# Patient Record
Sex: FEMALE | Race: WHITE | NOT HISPANIC OR LATINO | Employment: OTHER | URBAN - METROPOLITAN AREA
[De-identification: names, ages, dates, MRNs, and addresses within clinical notes are randomized per-mention and may not be internally consistent; named-entity substitution may affect disease eponyms.]

---

## 2020-02-14 ENCOUNTER — OFFICE VISIT (OUTPATIENT)
Dept: UROLOGY | Facility: CLINIC | Age: 60
End: 2020-02-14
Payer: COMMERCIAL

## 2020-02-14 VITALS
HEIGHT: 67 IN | BODY MASS INDEX: 23.54 KG/M2 | DIASTOLIC BLOOD PRESSURE: 85 MMHG | HEART RATE: 78 BPM | SYSTOLIC BLOOD PRESSURE: 145 MMHG | WEIGHT: 150 LBS

## 2020-02-14 DIAGNOSIS — N39.3 STRESS INCONTINENCE OF URINE: Primary | ICD-10-CM

## 2020-02-14 PROCEDURE — 99204 OFFICE O/P NEW MOD 45 MIN: CPT | Mod: S$GLB,,, | Performed by: UROLOGY

## 2020-02-14 PROCEDURE — 3008F PR BODY MASS INDEX (BMI) DOCUMENTED: ICD-10-PCS | Mod: CPTII,S$GLB,, | Performed by: UROLOGY

## 2020-02-14 PROCEDURE — 3008F BODY MASS INDEX DOCD: CPT | Mod: CPTII,S$GLB,, | Performed by: UROLOGY

## 2020-02-14 PROCEDURE — 99204 PR OFFICE/OUTPT VISIT, NEW, LEVL IV, 45-59 MIN: ICD-10-PCS | Mod: S$GLB,,, | Performed by: UROLOGY

## 2020-02-14 RX ORDER — ROSUVASTATIN CALCIUM 5 MG/1
TABLET, COATED ORAL
COMMUNITY
Start: 2020-01-29 | End: 2021-12-27 | Stop reason: DRUGHIGH

## 2020-02-14 RX ORDER — RABEPRAZOLE SODIUM 20 MG/1
TABLET, DELAYED RELEASE ORAL
COMMUNITY
Start: 2020-01-01 | End: 2023-01-10 | Stop reason: SDUPTHER

## 2020-02-14 RX ORDER — DICYCLOMINE HYDROCHLORIDE 10 MG/5ML
SOLUTION ORAL
COMMUNITY
Start: 2020-01-23 | End: 2022-04-21 | Stop reason: SDUPTHER

## 2020-02-14 RX ORDER — TERCONAZOLE 4 MG/G
CREAM VAGINAL
COMMUNITY
Start: 2020-02-12 | End: 2023-06-22

## 2020-02-14 NOTE — PROGRESS NOTES
Subjective:       Patient ID: Johanna Pichardo is a 59 y.o. female.    Chief Complaint: Urinary Frequency      HPI: 58 yo female with complaint of feeling as if she does not empty.  Pt also states having some leakage,  Pt also has some ongoing gyn problems.  Pt states her urologic concerns have been going on for some time and are getting progressively worse       Past Medical History:   Past Medical History:   Diagnosis Date    Anxiety     High cholesterol        Past Surgical Historical:   Past Surgical History:   Procedure Laterality Date    APPENDECTOMY      OOPHORECTOMY          Medications:   Medication List with Changes/Refills   Current Medications    DICYCLOMINE (BENTYL) 10 MG/5 ML SYRUP        RABEPRAZOLE (ACIPHEX) 20 MG TABLET        ROSUVASTATIN (CRESTOR) 5 MG TABLET        TERCONAZOLE (TERAZOL 7) 0.4 % CREA            Past Social History:   Social History     Socioeconomic History    Marital status:      Spouse name: Not on file    Number of children: Not on file    Years of education: Not on file    Highest education level: Not on file   Occupational History    Not on file   Social Needs    Financial resource strain: Not on file    Food insecurity:     Worry: Not on file     Inability: Not on file    Transportation needs:     Medical: Not on file     Non-medical: Not on file   Tobacco Use    Smoking status: Current Every Day Smoker     Packs/day: 0.25   Substance and Sexual Activity    Alcohol use: Yes    Drug use: Never    Sexual activity: Not on file   Lifestyle    Physical activity:     Days per week: Not on file     Minutes per session: Not on file    Stress: Not on file   Relationships    Social connections:     Talks on phone: Not on file     Gets together: Not on file     Attends Sikhism service: Not on file     Active member of club or organization: Not on file     Attends meetings of clubs or organizations: Not on file     Relationship status: Not on file   Other Topics  Concern    Not on file   Social History Narrative    Not on file       Allergies: Review of patient's allergies indicates:  No Known Allergies     Family History: History reviewed. No pertinent family history.     Review of Systems:  Review of Systems   Constitutional: Negative for activity change and appetite change.   HENT: Negative for congestion and dental problem.    Respiratory: Negative for chest tightness and shortness of breath.    Cardiovascular: Negative for chest pain.   Gastrointestinal: Negative for abdominal distention and abdominal pain.   Genitourinary: Negative for decreased urine volume, difficulty urinating, dyspareunia, dysuria, enuresis, flank pain, frequency, genital sores, hematuria, pelvic pain and urgency.   Musculoskeletal: Negative for back pain and neck pain.   Allergic/Immunologic: Negative for immunocompromised state.   Neurological: Negative for dizziness.   Hematological: Negative for adenopathy.   Psychiatric/Behavioral: Negative for agitation, behavioral problems and confusion.       Physical Exam:  Physical Exam   Nursing note and vitals reviewed.  Constitutional: She is oriented to person, place, and time. She appears well-developed and well-nourished.   HENT:   Head: Normocephalic.   Eyes: Pupils are equal, round, and reactive to light.   Neck: Normal range of motion. Neck supple.   Cardiovascular: Normal rate, regular rhythm and normal heart sounds.    Pulmonary/Chest: Effort normal and breath sounds normal.   Abdominal: Soft. Bowel sounds are normal.   Genitourinary:         Musculoskeletal: Normal range of motion.   Neurological: She is alert and oriented to person, place, and time.   Skin: Skin is warm and dry.     Psychiatric: She has a normal mood and affect. Her behavior is normal.       Assessment/Plan:     incomplete bladder emptying and incontinence--will plan cmg and cysto  Problem List Items Addressed This Visit     None

## 2020-02-26 ENCOUNTER — PROCEDURE VISIT (OUTPATIENT)
Dept: UROLOGY | Facility: CLINIC | Age: 60
End: 2020-02-26
Payer: COMMERCIAL

## 2020-02-26 VITALS
SYSTOLIC BLOOD PRESSURE: 187 MMHG | WEIGHT: 148 LBS | DIASTOLIC BLOOD PRESSURE: 86 MMHG | HEIGHT: 67 IN | HEART RATE: 72 BPM | BODY MASS INDEX: 23.23 KG/M2 | RESPIRATION RATE: 16 BRPM

## 2020-02-26 DIAGNOSIS — N39.3 STRESS INCONTINENCE OF URINE: Primary | ICD-10-CM

## 2020-02-26 DIAGNOSIS — Q64.32 CONGENITAL STRICTURE OF URETHRA: ICD-10-CM

## 2020-02-26 LAB
BILIRUB UR QL STRIP: NEGATIVE
GLUCOSE UR QL STRIP: NEGATIVE
KETONES UR QL STRIP: NEGATIVE
LEUKOCYTE ESTERASE UR QL STRIP: NEGATIVE
PH, POC UA: 5.5
POC AMORP, URINE: ABNORMAL
POC BACTI, URINE: ABNORMAL
POC BLOOD, URINE: POSITIVE
POC CASTS, URINE: ABNORMAL
POC CRYST, URINE: ABNORMAL
POC EPITH, URINE: ABNORMAL
POC HCG, URINE: ABNORMAL
POC HYALIN, URINE: 0 LPF
POC MUCUS, URINE: ABNORMAL
POC NITRATES, URINE: NEGATIVE
POC OTHER, URINE: ABNORMAL
POC RBC, URINE: ABNORMAL HPF
POC WBC, URINE: ABNORMAL HPF
PROT UR QL STRIP: NEGATIVE
SP GR UR STRIP: 1 (ref 1–1.03)
UROBILINOGEN UR STRIP-ACNC: 0.2 (ref 0.1–1.1)

## 2020-02-26 PROCEDURE — 52000: ICD-10-PCS | Mod: S$GLB,,, | Performed by: UROLOGY

## 2020-02-26 PROCEDURE — 52000 CYSTOURETHROSCOPY: CPT | Mod: S$GLB,,, | Performed by: UROLOGY

## 2020-02-26 RX ORDER — CIPROFLOXACIN 500 MG/1
500 TABLET ORAL
Status: COMPLETED | OUTPATIENT
Start: 2020-02-26 | End: 2020-02-26

## 2020-02-26 RX ADMIN — CIPROFLOXACIN 500 MG: 500 TABLET ORAL at 12:02

## 2020-02-26 NOTE — PATIENT INSTRUCTIONS
Cystoscopy    Cystoscopy is a procedure that lets your doctor look directly inside your urethra and bladder. It can be used to:  · Help diagnose a problem with your urethra, bladder, or kidneys.  · Take a sample (biopsy) of bladder or urethral tissue.  · Treat certain problems (such as removing kidney stones).  · Place a stent to bypass an obstruction.  · Take special X-rays of the kidneys.  Based on the findings, your doctor may recommend other tests or treatments.  What is a cystoscope?  A cystoscope is a telescope-like instrument that contains lenses and fiberoptics (small glass wires that make bright light). The cystoscope may be straight and rigid, or flexible to bend around curves in the urethra. The doctor may look directly into the cystoscope, or project the image onto a monitor.  Getting ready  · Ask your doctor if you should stop taking any medicines before the procedure.  · Ask whether you should avoid eating or drinking anything after midnight before the procedure.  · Follow any other instructions your doctor gives you.  Tell your doctor before the exam if you:  · Take any medicines, such as aspirin or blood thinners  · Have allergies to any medicines  · Are pregnant   The procedure  Cystoscopy is done in the doctors office, surgery center, or hospital. The doctor and a nurse are present during the procedure. It takes only a few minutes, longer if a biopsy, X-ray, or treatment needs to be done.  During the procedure:  · You lie on an exam table on your back, knees bent and legs apart. You are covered with a drape.  · Your urethra and the area around it are washed. Anesthetic jelly may be applied to numb the urethra. Other pain medicine is usually not needed. In some cases, you may be offered a mild sedative to help you relax. If a more extensive procedure is to be done, such as a biopsy or kidney stone removal, general anesthesia may be needed.  · The cystoscope is inserted. A sterile fluid is put  into the bladder to expand it. You may feel pressure from this fluid.  · When the procedure is done, the cystoscope is removed.  After the procedure  If you had a sedative, general anesthesia, or spinal anesthesia, you must have someone drive you home. Once youre home:  · Drink plenty of fluids.  · You may have burning or light bleeding when you urinate--this is normal.  · Medicines may be prescribed to ease any discomfort or prevent infection. Take these as directed.  · Call your doctor if you have heavy bleeding or blood clots, burning that lasts more than a day, a fever over 100°F  (38° C), or trouble urinating.  Date Last Reviewed: 1/1/2017  © 3435-2226 [employer]. [address] [place] [address], [place] [address]. All rights reserved. This information is not intended as a substitute for professional medical care. Always follow your healthcare professional's instructions.        Time Voiding   While awake, attempt to urinate every 2 hours whether or not you feel the urge.    Double Voiding  After you urinate, wait 10-15 seconds and attempt to empty your bladder whether or not you feel the urge.

## 2020-02-26 NOTE — PROCEDURES
"Cystoscopy with dilation  Date/Time: 2/26/2020 1:30 PM  Performed by: Isra Maurice MD  Authorized by: Isra Maurice MD     Consent Done?:  Yes (Written)  Time out: Immediately prior to procedure a "time out" was called to verify the correct patient, procedure, equipment, support staff and site/side marked as required.    Indications: incontinence and urethral stricture    Indications comment:  Incomplete bladder emptying  Anesthesia:  Intraurethral instillation  Patient sedated?: No    Preparation: Patient was prepped and draped in usual sterile fashion      Scope type:  Rigid cystoscope  Stent inserted: No    Stent removed: No    External exam normal: Yes    Digital exam performed: No    Urethra normal: very tight dilated to 24 Wol[place].  Bladder neck normal: Bladder neck normal   Bladder normal: Yes      Patient tolerance:  Patient tolerated the procedure well with no immediate complications     Will see how she does with the dilation along with timed and double voiding      "

## 2020-03-23 ENCOUNTER — OFFICE VISIT (OUTPATIENT)
Dept: UROLOGY | Facility: CLINIC | Age: 60
End: 2020-03-23
Payer: COMMERCIAL

## 2020-03-23 VITALS
SYSTOLIC BLOOD PRESSURE: 174 MMHG | HEIGHT: 67 IN | BODY MASS INDEX: 23.54 KG/M2 | WEIGHT: 150 LBS | HEART RATE: 66 BPM | DIASTOLIC BLOOD PRESSURE: 93 MMHG

## 2020-03-23 DIAGNOSIS — R33.9 INCOMPLETE BLADDER EMPTYING: ICD-10-CM

## 2020-03-23 DIAGNOSIS — R35.0 URINARY FREQUENCY: ICD-10-CM

## 2020-03-23 DIAGNOSIS — N39.3 STRESS INCONTINENCE: Primary | ICD-10-CM

## 2020-03-23 LAB
BILIRUB UR QL STRIP: NEGATIVE
GLUCOSE UR QL STRIP: NEGATIVE
KETONES UR QL STRIP: NEGATIVE
LEUKOCYTE ESTERASE UR QL STRIP: NEGATIVE
PH, POC UA: 6.5
POC AMORP, URINE: ABNORMAL
POC BACTI, URINE: ABNORMAL
POC BLOOD, URINE: POSITIVE
POC CASTS, URINE: ABNORMAL
POC CRYST, URINE: ABNORMAL
POC EPITH, URINE: +1=
POC HCG, URINE: ABNORMAL
POC HYALIN, URINE: ABNORMAL LPF
POC MUCUS, URINE: ABNORMAL
POC NITRATES, URINE: NEGATIVE
POC OTHER, URINE: ABNORMAL
POC RBC, URINE: ABNORMAL HPF
POC RESIDUAL URINE VOLUME: 0 ML (ref 0–100)
POC WBC, URINE: ABNORMAL HPF
PROT UR QL STRIP: NEGATIVE
SP GR UR STRIP: 1.01 (ref 1–1.03)
UROBILINOGEN UR STRIP-ACNC: 0.2 (ref 0.1–1.1)

## 2020-03-23 PROCEDURE — 3008F BODY MASS INDEX DOCD: CPT | Mod: CPTII,S$GLB,, | Performed by: NURSE PRACTITIONER

## 2020-03-23 PROCEDURE — 51798 US URINE CAPACITY MEASURE: CPT | Mod: S$GLB,,, | Performed by: NURSE PRACTITIONER

## 2020-03-23 PROCEDURE — 3008F PR BODY MASS INDEX (BMI) DOCUMENTED: ICD-10-PCS | Mod: CPTII,S$GLB,, | Performed by: NURSE PRACTITIONER

## 2020-03-23 PROCEDURE — 99213 PR OFFICE/OUTPT VISIT, EST, LEVL III, 20-29 MIN: ICD-10-PCS | Mod: S$GLB,,, | Performed by: NURSE PRACTITIONER

## 2020-03-23 PROCEDURE — 51798 POCT BLADDER SCAN: ICD-10-PCS | Mod: S$GLB,,, | Performed by: NURSE PRACTITIONER

## 2020-03-23 PROCEDURE — 99213 OFFICE O/P EST LOW 20 MIN: CPT | Mod: S$GLB,,, | Performed by: NURSE PRACTITIONER

## 2020-03-23 NOTE — PROGRESS NOTES
Subjective:       Patient ID: Johanna Pichardo is a 59 y.o. female.    Chief Complaint: Other (cysto/dilation fu )      HPI: 51-year-old female, patient Dr. Maurice, presents for follow-up post cysto dilation.  Patient presented with complaints of some stress incontinence, incomplete bladder emptying, and urinary frequency.  Last visit patient's on 02/14/2020 the PVR was 200 cc.  On 02/26/2020 patient had a cysto dilation.  Patient states she has had improvement in her symptoms since the dilation.  Her frequency has greatly decreased.  Patient states her stress incontinence is very rare.  Patient states she feels she is emptying her bladder.  Low patient denies pain or burning urination.  Denies difficulty voiding.  Denies flank pain.  Denies blood in her urine.  No other urinary complaints.  All other health problems are stable at this time.       Past Medical History:   Past Medical History:   Diagnosis Date    Anxiety     High cholesterol        Past Surgical Historical:   Past Surgical History:   Procedure Laterality Date    APPENDECTOMY      CYSTOSCOPY      OOPHORECTOMY          Medications:   Medication List with Changes/Refills   Current Medications    DICYCLOMINE (BENTYL) 10 MG/5 ML SYRUP        RABEPRAZOLE (ACIPHEX) 20 MG TABLET        ROSUVASTATIN (CRESTOR) 5 MG TABLET        TERCONAZOLE (TERAZOL 7) 0.4 % CREA            Past Social History:   Social History     Socioeconomic History    Marital status:      Spouse name: Not on file    Number of children: Not on file    Years of education: Not on file    Highest education level: Not on file   Occupational History    Not on file   Social Needs    Financial resource strain: Not on file    Food insecurity:     Worry: Not on file     Inability: Not on file    Transportation needs:     Medical: Not on file     Non-medical: Not on file   Tobacco Use    Smoking status: Current Every Day Smoker     Packs/day: 0.25   Substance and Sexual Activity     Alcohol use: Yes    Drug use: Never    Sexual activity: Not on file   Lifestyle    Physical activity:     Days per week: Not on file     Minutes per session: Not on file    Stress: Not on file   Relationships    Social connections:     Talks on phone: Not on file     Gets together: Not on file     Attends Lutheran service: Not on file     Active member of club or organization: Not on file     Attends meetings of clubs or organizations: Not on file     Relationship status: Not on file   Other Topics Concern    Not on file   Social History Narrative    Not on file       Allergies: Review of patient's allergies indicates:  No Known Allergies     Family History: History reviewed. No pertinent family history.     Review of Systems:  Review of Systems   Constitutional: Negative for activity change and appetite change.   HENT: Negative for congestion and dental problem.    Respiratory: Negative for chest tightness and shortness of breath.    Cardiovascular: Negative for chest pain.   Gastrointestinal: Negative for abdominal distention.   Genitourinary: Negative for decreased urine volume, difficulty urinating, dyspareunia, dysuria, enuresis, flank pain, frequency, genital sores, hematuria, pelvic pain and urgency.   Musculoskeletal: Negative for back pain and neck pain.   Allergic/Immunologic: Negative for immunocompromised state.   Neurological: Negative for dizziness.   Hematological: Negative for adenopathy.   Psychiatric/Behavioral: Negative for agitation, behavioral problems and confusion.       Physical Exam:  Physical Exam   Nursing note and vitals reviewed.  Constitutional: She is oriented to person, place, and time. She appears well-developed and well-nourished.   HENT:   Head: Normocephalic.   Cardiovascular: Normal rate, regular rhythm and normal heart sounds.    Pulmonary/Chest: Effort normal and breath sounds normal.   Abdominal: Soft. Bowel sounds are normal.   Neurological: She is alert and  oriented to person, place, and time.   Skin: Skin is warm and dry.      Bladder scan:  0 cc.  Urinalysis:  Trace intact blood, red blood cells negative.    Assessment/Plan:   Stress incontinence with incomplete bladder emptying urinary frequency:  Patient states she is doing well since the dilation.  PVR stable from 200 to 0 cc.  Patient states she is happy with results.  Patient has an appointment scheduled for May 25th.  Patient would like to keep that appointment.    Follow-up sooner if needed.  Problem List Items Addressed This Visit     None      Visit Diagnoses     Stress incontinence    -  Primary    Relevant Orders    POCT Urinalysis (w/Micro Option)    POCT Bladder Scan    Incomplete bladder emptying        Relevant Orders    POCT Urinalysis (w/Micro Option)    POCT Bladder Scan    Urinary frequency        Relevant Orders    POCT Urinalysis (w/Micro Option)    POCT Bladder Scan

## 2020-06-11 ENCOUNTER — OFFICE VISIT (OUTPATIENT)
Dept: UROLOGY | Facility: CLINIC | Age: 60
End: 2020-06-11
Payer: COMMERCIAL

## 2020-06-11 VITALS — HEART RATE: 84 BPM | SYSTOLIC BLOOD PRESSURE: 137 MMHG | DIASTOLIC BLOOD PRESSURE: 86 MMHG

## 2020-06-11 DIAGNOSIS — N39.3 STRESS INCONTINENCE: Primary | ICD-10-CM

## 2020-06-11 DIAGNOSIS — R35.0 URINARY FREQUENCY: ICD-10-CM

## 2020-06-11 LAB
BILIRUB UR QL STRIP: NEGATIVE
CLARITY, POC UA: ABNORMAL
COLOR, POC UA: ABNORMAL
GLUCOSE UR QL STRIP: NEGATIVE
KETONES UR QL STRIP: NEGATIVE
LEUKOCYTE ESTERASE UR QL STRIP: NEGATIVE
NITRITE, POC UA: ABNORMAL
PH, POC UA: 5
POC AMORP, URINE: ABNORMAL
POC BACTI, URINE: ABNORMAL
POC BLOOD, URINE: POSITIVE
POC CASTS, URINE: ABNORMAL
POC CRYST, URINE: ABNORMAL
POC EPITH, URINE: ABNORMAL
POC HCG, URINE: ABNORMAL
POC HYALIN, URINE: ABNORMAL LPF
POC MUCUS, URINE: ABNORMAL
POC NITRATES, URINE: NEGATIVE
POC OTHER, URINE: ABNORMAL
POC RBC, URINE: ABNORMAL HPF
POC WBC, URINE: ABNORMAL HPF
PROT UR QL STRIP: NEGATIVE
SP GR UR STRIP: <=1.005 (ref 1–1.03)
UROBILINOGEN UR STRIP-ACNC: 0.2 (ref 0.1–1.1)

## 2020-06-11 PROCEDURE — 99213 OFFICE O/P EST LOW 20 MIN: CPT | Mod: S$GLB,,, | Performed by: NURSE PRACTITIONER

## 2020-06-11 PROCEDURE — 99213 PR OFFICE/OUTPT VISIT, EST, LEVL III, 20-29 MIN: ICD-10-PCS | Mod: S$GLB,,, | Performed by: NURSE PRACTITIONER

## 2020-06-11 NOTE — PROGRESS NOTES
Subjective:       Patient ID: Johanna Pichardo is a 59 y.o. female.    Chief Complaint: Other ( fu )      HPI: 67-year-old female known service Dr. Josafat peralta recently underwent cystoscopy for a complaints of stress incontinence.  She was whole in high residuals.  He did urethral dilation she was very tight dilated her up to 24 [place].  Instructed her to double void with each void.  She returns today for re-evaluation.  States she is dry.  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  Again she remains dry.  No splitting spraying of the urinary stream.  She feels empty at completion; no new urologic complaints       Past Medical History:   Past Medical History:   Diagnosis Date    Anxiety     High cholesterol        Past Surgical Historical:   Past Surgical History:   Procedure Laterality Date    APPENDECTOMY      CYSTOSCOPY      OOPHORECTOMY          Medications:   Medication List with Changes/Refills   Current Medications    DICYCLOMINE (BENTYL) 10 MG/5 ML SYRUP        RABEPRAZOLE (ACIPHEX) 20 MG TABLET        ROSUVASTATIN (CRESTOR) 5 MG TABLET        TERCONAZOLE (TERAZOL 7) 0.4 % CREA            Past Social History:   Social History     Socioeconomic History    Marital status:      Spouse name: Not on file    Number of children: Not on file    Years of education: Not on file    Highest education level: Not on file   Occupational History    Not on file   Social Needs    Financial resource strain: Not on file    Food insecurity:     Worry: Not on file     Inability: Not on file    Transportation needs:     Medical: Not on file     Non-medical: Not on file   Tobacco Use    Smoking status: Current Every Day Smoker     Packs/day: 0.25   Substance and Sexual Activity    Alcohol use: Yes    Drug use: Never    Sexual activity: Not on file   Lifestyle    Physical activity:     Days per  week: Not on file     Minutes per session: Not on file    Stress: Not on file   Relationships    Social connections:     Talks on phone: Not on file     Gets together: Not on file     Attends Gnosticism service: Not on file     Active member of club or organization: Not on file     Attends meetings of clubs or organizations: Not on file     Relationship status: Not on file   Other Topics Concern    Not on file   Social History Narrative    Not on file       Allergies: Review of patient's allergies indicates:  No Known Allergies     Family History: History reviewed. No pertinent family history.     Review of Systems:  Review of Systems   Constitutional: Negative for activity change and appetite change.   HENT: Negative for congestion and dental problem.    Respiratory: Negative for chest tightness and shortness of breath.    Cardiovascular: Negative for chest pain.   Gastrointestinal: Negative for abdominal distention.   Genitourinary: Negative for decreased urine volume, difficulty urinating, dyspareunia, dysuria, enuresis, flank pain, frequency, genital sores, hematuria, pelvic pain and urgency.   Musculoskeletal: Negative for back pain and neck pain.   Allergic/Immunologic: Negative for immunocompromised state.   Neurological: Negative for dizziness.   Hematological: Negative for adenopathy.   Psychiatric/Behavioral: Negative for agitation, behavioral problems and confusion.       Physical Exam:  Physical Exam   Constitutional: She is oriented to person, place, and time. She appears well-developed and well-nourished.   HENT:   Head: Normocephalic and atraumatic.   Eyes: No scleral icterus.   Neck: Normal range of motion.   Cardiovascular: Intact distal pulses.    Pulmonary/Chest: Effort normal and breath sounds normal.   Abdominal: Soft. She exhibits no distension. There is no tenderness.   Genitourinary: Rectum normal and vagina normal. Pelvic exam was performed with patient supine. There is no rash,  tenderness or lesion on the right labia. There is no rash, tenderness or lesion on the left labia.   Musculoskeletal: She exhibits no edema.   Neurological: She is alert and oriented to person, place, and time.   Skin: Skin is warm and dry.     Psychiatric: She has a normal mood and affect.       Assessment/Plan:   Stress incontinence--secondary to incomplete bladder emptying.  She had a normal bladder neck on cystoscopy.  She is completely dry since urethral dilation.  Monitor for recurrence.  Urinalysis today negative, PVR 86 mL    Problem List Items Addressed This Visit     None      Visit Diagnoses     Stress incontinence    -  Primary    Urinary frequency        Relevant Orders    POCT Urinalysis (w/Micro Option)

## 2020-11-10 ENCOUNTER — OFFICE VISIT (OUTPATIENT)
Dept: UROLOGY | Facility: CLINIC | Age: 60
End: 2020-11-10
Payer: COMMERCIAL

## 2020-11-10 DIAGNOSIS — N39.3 STRESS INCONTINENCE OF URINE: ICD-10-CM

## 2020-11-10 DIAGNOSIS — R30.0 DYSURIA: Primary | ICD-10-CM

## 2020-11-10 LAB — POC RESIDUAL URINE VOLUME: 246 ML (ref 0–100)

## 2020-11-10 PROCEDURE — 51798 US URINE CAPACITY MEASURE: CPT | Mod: S$GLB,,, | Performed by: UROLOGY

## 2020-11-10 PROCEDURE — 51798 POCT BLADDER SCAN: ICD-10-PCS | Mod: S$GLB,,, | Performed by: UROLOGY

## 2020-11-10 PROCEDURE — 99213 PR OFFICE/OUTPT VISIT, EST, LEVL III, 20-29 MIN: ICD-10-PCS | Mod: S$GLB,,, | Performed by: UROLOGY

## 2020-11-10 PROCEDURE — 99213 OFFICE O/P EST LOW 20 MIN: CPT | Mod: S$GLB,,, | Performed by: UROLOGY

## 2020-11-10 NOTE — PROGRESS NOTES
Subjective:       Patient ID: Johanna Pichardo is a 60 y.o. female.    Chief Complaint: No chief complaint on file.      HPI: 59 yo female with complaint of generalized irritation below but no dysuria.  Pt has no significant leakage       Past Medical History:   Past Medical History:   Diagnosis Date    Anxiety     High cholesterol        Past Surgical Historical:   Past Surgical History:   Procedure Laterality Date    APPENDECTOMY      CYSTOSCOPY      OOPHORECTOMY          Medications:   Medication List with Changes/Refills   Current Medications    DICYCLOMINE (BENTYL) 10 MG/5 ML SYRUP        RABEPRAZOLE (ACIPHEX) 20 MG TABLET        ROSUVASTATIN (CRESTOR) 5 MG TABLET        TERCONAZOLE (TERAZOL 7) 0.4 % CREA            Past Social History:   Social History     Socioeconomic History    Marital status:      Spouse name: Not on file    Number of children: Not on file    Years of education: Not on file    Highest education level: Not on file   Occupational History    Not on file   Social Needs    Financial resource strain: Not on file    Food insecurity     Worry: Not on file     Inability: Not on file    Transportation needs     Medical: Not on file     Non-medical: Not on file   Tobacco Use    Smoking status: Current Every Day Smoker     Packs/day: 0.25   Substance and Sexual Activity    Alcohol use: Yes    Drug use: Never    Sexual activity: Not on file   Lifestyle    Physical activity     Days per week: Not on file     Minutes per session: Not on file    Stress: Not on file   Relationships    Social connections     Talks on phone: Not on file     Gets together: Not on file     Attends Alevism service: Not on file     Active member of club or organization: Not on file     Attends meetings of clubs or organizations: Not on file     Relationship status: Not on file   Other Topics Concern    Not on file   Social History Narrative    Not on file       Allergies: Review of patient's allergies  indicates:  No Known Allergies     Family History: History reviewed. No pertinent family history.     Review of Systems:  Review of Systems   Constitutional: Negative for activity change and appetite change.   HENT: Negative for congestion and dental problem.    Respiratory: Negative for chest tightness and shortness of breath.    Cardiovascular: Negative for chest pain.   Gastrointestinal: Negative for abdominal distention.   Genitourinary: Negative for decreased urine volume, difficulty urinating, dyspareunia, dysuria, enuresis, flank pain, frequency, genital sores, hematuria, pelvic pain and urgency.   Musculoskeletal: Negative for back pain and neck pain.   Allergic/Immunologic: Negative for immunocompromised state.   Neurological: Negative for dizziness.   Hematological: Negative for adenopathy.   Psychiatric/Behavioral: Negative for agitation, behavioral problems and confusion.       Physical Exam:  Physical Exam  Vitals signs and nursing note reviewed.   Constitutional:       Appearance: She is well-developed.   HENT:      Head: Normocephalic.   Cardiovascular:      Rate and Rhythm: Normal rate and regular rhythm.      Heart sounds: Normal heart sounds.   Pulmonary:      Effort: Pulmonary effort is normal.      Breath sounds: Normal breath sounds.   Abdominal:      General: Bowel sounds are normal.      Palpations: Abdomen is soft.   Genitourinary:      Skin:     General: Skin is warm and dry.   Neurological:      Mental Status: She is alert and oriented to person, place, and time.     ua is ok  Bladder scan is 246    Assessment/Plan:     essentially nl gu exam except for incomplete bladder emptying.  Will do some behavioral modification.  Problem List Items Addressed This Visit     None      Visit Diagnoses     Dysuria    -  Primary    Relevant Orders    POCT Urinalysis (w/Micro Option)    Stress incontinence of urine        Relevant Orders    POCT Bladder Scan

## 2020-12-03 ENCOUNTER — TELEPHONE (OUTPATIENT)
Dept: UROLOGY | Facility: CLINIC | Age: 60
End: 2020-12-03

## 2020-12-03 DIAGNOSIS — R10.2 PELVIC PAIN: Primary | ICD-10-CM

## 2020-12-03 NOTE — TELEPHONE ENCOUNTER
Patient notified that she needs to keep her f/u appointment to discuss any tests she would like to have ordered

## 2020-12-03 NOTE — TELEPHONE ENCOUNTER
----- Message from Lindy Duque sent at 12/3/2020  1:38 PM CST -----  Regarding: call back  Johanna Pichardo called in regards to a vaginal ultra sound and she would like to speak with staff. Please call back [phone]

## 2020-12-03 NOTE — PROGRESS NOTES
Patient complaining of pelvic pain.  Discussed with patient's GYN, will order transvaginal ultra sound.

## 2020-12-08 ENCOUNTER — OFFICE VISIT (OUTPATIENT)
Dept: UROLOGY | Facility: CLINIC | Age: 60
End: 2020-12-08
Payer: COMMERCIAL

## 2020-12-08 VITALS — HEIGHT: 67 IN | RESPIRATION RATE: 18 BRPM | WEIGHT: 150 LBS | BODY MASS INDEX: 23.54 KG/M2

## 2020-12-08 DIAGNOSIS — R10.30 LOWER ABDOMINAL PAIN: Primary | ICD-10-CM

## 2020-12-08 DIAGNOSIS — Q64.32 CONGENITAL STRICTURE OF URETHRA: ICD-10-CM

## 2020-12-08 DIAGNOSIS — N39.3 STRESS INCONTINENCE: ICD-10-CM

## 2020-12-08 DIAGNOSIS — R10.2 PELVIC PAIN: ICD-10-CM

## 2020-12-08 LAB — POC RESIDUAL URINE VOLUME: 84 ML (ref 0–100)

## 2020-12-08 PROCEDURE — 99214 PR OFFICE/OUTPT VISIT, EST, LEVL IV, 30-39 MIN: ICD-10-PCS | Mod: S$GLB,,, | Performed by: NURSE PRACTITIONER

## 2020-12-08 PROCEDURE — 99214 OFFICE O/P EST MOD 30 MIN: CPT | Mod: S$GLB,,, | Performed by: NURSE PRACTITIONER

## 2020-12-08 PROCEDURE — 51798 US URINE CAPACITY MEASURE: CPT | Mod: S$GLB,,, | Performed by: NURSE PRACTITIONER

## 2020-12-08 PROCEDURE — 51798 POCT BLADDER SCAN: ICD-10-PCS | Mod: S$GLB,,, | Performed by: NURSE PRACTITIONER

## 2020-12-08 NOTE — PROGRESS NOTES
Patient seen chart reviewed case discussed with Gideon.  Patients pain is not urologic.  Patient to proceed with her pelvic ultrasound.  We will contact her with the results and arrange fu.

## 2020-12-08 NOTE — PROGRESS NOTES
Subjective:       Patient ID: Johanna Pichardo is a 60 y.o. female.    Chief Complaint: Follow-up      HPI: 60-year-old female, patient Dr. Maurice, presents with complaint of pelvic pain.  Patient presents with complaint of lower abdominal/pelvic pain.  Patient rates the pain presently a 5/10 with episodes of 10/10.  Patient describes the pain as pressure.  Patient patient denies any pain or burning urination.  States she has a pretty good stream.  Denies any difficulty voiding.  Denies blood in urine.  Denies flank pain.  Denies fever.  The patient history of stress incontinence and a congenital stricture the urethra.  Patient underwent a cysto dilation in February 2020.    Patient does have history of uterine fibroids.  Ultrasound on 01/13/2020 shows 1.2 cm mass in the lower urine segment consistent with a fibroid.  No other urinary complaints.  All other health problems appear stable at this time.       Past Medical History:   Past Medical History:   Diagnosis Date    Anxiety     High cholesterol        Past Surgical Historical:   Past Surgical History:   Procedure Laterality Date    APPENDECTOMY      CYSTOSCOPY      OOPHORECTOMY          Medications:   Medication List with Changes/Refills   Current Medications    DICYCLOMINE (BENTYL) 10 MG/5 ML SYRUP        RABEPRAZOLE (ACIPHEX) 20 MG TABLET        ROSUVASTATIN (CRESTOR) 5 MG TABLET        TERCONAZOLE (TERAZOL 7) 0.4 % CREA            Past Social History:   Social History     Socioeconomic History    Marital status:      Spouse name: Not on file    Number of children: Not on file    Years of education: Not on file    Highest education level: Not on file   Occupational History    Not on file   Social Needs    Financial resource strain: Not on file    Food insecurity     Worry: Not on file     Inability: Not on file    Transportation needs     Medical: Not on file     Non-medical: Not on file   Tobacco Use    Smoking status: Current Every Day  Smoker     Packs/day: 0.25   Substance and Sexual Activity    Alcohol use: Yes    Drug use: Never    Sexual activity: Not on file   Lifestyle    Physical activity     Days per week: Not on file     Minutes per session: Not on file    Stress: Not on file   Relationships    Social connections     Talks on phone: Not on file     Gets together: Not on file     Attends Shinto service: Not on file     Active member of club or organization: Not on file     Attends meetings of clubs or organizations: Not on file     Relationship status: Not on file   Other Topics Concern    Not on file   Social History Narrative    Not on file       Allergies: Review of patient's allergies indicates:  No Known Allergies     Family History: History reviewed. No pertinent family history.     Review of Systems:  Review of Systems   Constitutional: Negative for activity change and appetite change.   HENT: Negative for congestion and dental problem.    Respiratory: Negative for chest tightness and shortness of breath.    Cardiovascular: Negative for chest pain.   Gastrointestinal: Positive for abdominal pain. Negative for abdominal distention.   Genitourinary: Negative for decreased urine volume, difficulty urinating, dyspareunia, dysuria, enuresis, flank pain, frequency, genital sores, hematuria, pelvic pain and urgency.   Musculoskeletal: Negative for back pain and neck pain.   Allergic/Immunologic: Negative for immunocompromised state.   Neurological: Negative for dizziness.   Hematological: Negative for adenopathy.   Psychiatric/Behavioral: Negative for agitation, behavioral problems and confusion.       Physical Exam:  Physical Exam  Vitals signs and nursing note reviewed.   Constitutional:       Appearance: She is well-developed.   HENT:      Head: Normocephalic.   Eyes:      Pupils: Pupils are equal, round, and reactive to light.   Neck:      Musculoskeletal: Normal range of motion and neck supple.   Cardiovascular:      Rate  and Rhythm: Normal rate and regular rhythm.      Heart sounds: Normal heart sounds.   Pulmonary:      Effort: Pulmonary effort is normal.      Breath sounds: Normal breath sounds.   Abdominal:      General: Bowel sounds are normal.      Palpations: Abdomen is soft.   Musculoskeletal: Normal range of motion.   Skin:     General: Skin is warm and dry.   Neurological:      Mental Status: She is alert and oriented to person, place, and time.   Psychiatric:         Behavior: Behavior normal.       Urinalysis:  Trace intact blood, red blood cells 0-2.  Bladder scan:  84 cc    Assessment/Plan:   1.  Lower abdominal/pelvic pain:  Patient will be sent for a none will be transvaginal ultrasound.    2.  Stress incontinence:  Patient states she has been doing well with timed voiding and double voiding.    3.  Congenital urethral stricture:  Patient bladder scan is good.  Last bladder scan was 400.    Will contact the patient after her ultrasound to arrange follow-up.    Problem List Items Addressed This Visit     None      Visit Diagnoses     Lower abdominal pain    -  Primary    Relevant Orders    POCT Urinalysis (w/Micro Option)    Pelvic pain        Relevant Orders    POCT Urinalysis (w/Micro Option)    Stress incontinence        Congenital stricture of urethra

## 2021-11-03 ENCOUNTER — OFFICE VISIT (OUTPATIENT)
Dept: UROLOGY | Facility: CLINIC | Age: 61
End: 2021-11-03
Payer: COMMERCIAL

## 2021-11-03 VITALS — HEIGHT: 67 IN | BODY MASS INDEX: 24.17 KG/M2 | WEIGHT: 154 LBS

## 2021-11-03 DIAGNOSIS — R35.0 URINARY FREQUENCY: Primary | ICD-10-CM

## 2021-11-03 DIAGNOSIS — R39.15 URINARY URGENCY: ICD-10-CM

## 2021-11-03 DIAGNOSIS — R31.29 HEMATURIA, MICROSCOPIC: ICD-10-CM

## 2021-11-03 PROCEDURE — 1159F PR MEDICATION LIST DOCUMENTED IN MEDICAL RECORD: ICD-10-PCS | Mod: CPTII,S$GLB,, | Performed by: NURSE PRACTITIONER

## 2021-11-03 PROCEDURE — 51798 POCT BLADDER SCAN: ICD-10-PCS | Mod: S$GLB,,, | Performed by: NURSE PRACTITIONER

## 2021-11-03 PROCEDURE — 51798 US URINE CAPACITY MEASURE: CPT | Mod: S$GLB,,, | Performed by: NURSE PRACTITIONER

## 2021-11-03 PROCEDURE — 1160F RVW MEDS BY RX/DR IN RCRD: CPT | Mod: CPTII,S$GLB,, | Performed by: NURSE PRACTITIONER

## 2021-11-03 PROCEDURE — 4010F PR ACE/ARB THEARPY RXD/TAKEN: ICD-10-PCS | Mod: CPTII,S$GLB,, | Performed by: NURSE PRACTITIONER

## 2021-11-03 PROCEDURE — 4010F ACE/ARB THERAPY RXD/TAKEN: CPT | Mod: CPTII,S$GLB,, | Performed by: NURSE PRACTITIONER

## 2021-11-03 PROCEDURE — 1159F MED LIST DOCD IN RCRD: CPT | Mod: CPTII,S$GLB,, | Performed by: NURSE PRACTITIONER

## 2021-11-03 PROCEDURE — 99214 PR OFFICE/OUTPT VISIT, EST, LEVL IV, 30-39 MIN: ICD-10-PCS | Mod: S$GLB,,, | Performed by: NURSE PRACTITIONER

## 2021-11-03 PROCEDURE — 99214 OFFICE O/P EST MOD 30 MIN: CPT | Mod: S$GLB,,, | Performed by: NURSE PRACTITIONER

## 2021-11-03 PROCEDURE — 3008F PR BODY MASS INDEX (BMI) DOCUMENTED: ICD-10-PCS | Mod: CPTII,S$GLB,, | Performed by: NURSE PRACTITIONER

## 2021-11-03 PROCEDURE — 3008F BODY MASS INDEX DOCD: CPT | Mod: CPTII,S$GLB,, | Performed by: NURSE PRACTITIONER

## 2021-11-03 PROCEDURE — 1160F PR REVIEW ALL MEDS BY PRESCRIBER/CLIN PHARMACIST DOCUMENTED: ICD-10-PCS | Mod: CPTII,S$GLB,, | Performed by: NURSE PRACTITIONER

## 2021-11-03 RX ORDER — MELOXICAM 7.5 MG/1
TABLET ORAL
COMMUNITY
Start: 2021-10-14 | End: 2022-07-26 | Stop reason: ALTCHOICE

## 2021-11-03 RX ORDER — OMEPRAZOLE 40 MG/1
CAPSULE, DELAYED RELEASE ORAL
COMMUNITY
Start: 2021-10-14 | End: 2023-01-10

## 2021-11-03 RX ORDER — CLOBETASOL PROPIONATE 0.5 MG/G
CREAM TOPICAL
COMMUNITY
Start: 2021-10-11

## 2021-11-03 RX ORDER — LOTEPREDNOL ETABONATE 2 MG/ML
SUSPENSION/ DROPS OPHTHALMIC
COMMUNITY
Start: 2021-10-11

## 2021-11-03 RX ORDER — MONTELUKAST SODIUM 10 MG/1
TABLET ORAL
COMMUNITY
Start: 2021-10-14 | End: 2024-01-11

## 2021-11-03 RX ORDER — OXYBUTYNIN CHLORIDE 10 MG/1
10 TABLET, EXTENDED RELEASE ORAL DAILY
Qty: 30 TABLET | Refills: 11 | Status: SHIPPED | OUTPATIENT
Start: 2021-11-03 | End: 2022-11-10

## 2021-11-03 RX ORDER — NYSTATIN AND TRIAMCINOLONE ACETONIDE 100000; 1 [USP'U]/G; MG/G
CREAM TOPICAL
COMMUNITY
Start: 2021-09-15

## 2021-11-03 RX ORDER — LOSARTAN POTASSIUM 25 MG/1
TABLET ORAL
COMMUNITY
Start: 2021-10-13 | End: 2023-01-26

## 2021-11-03 RX ORDER — CLORAZEPATE DIPOTASSIUM 7.5 MG/1
TABLET ORAL
COMMUNITY
Start: 2021-10-22

## 2021-11-03 RX ORDER — AMITRIPTYLINE HYDROCHLORIDE 25 MG/1
TABLET, FILM COATED ORAL
COMMUNITY
Start: 2021-10-14 | End: 2024-01-11

## 2021-11-05 LAB — POC RESIDUAL URINE VOLUME: 36 ML (ref 0–100)

## 2021-12-27 ENCOUNTER — OFFICE VISIT (OUTPATIENT)
Dept: UROLOGY | Facility: CLINIC | Age: 61
End: 2021-12-27
Payer: COMMERCIAL

## 2021-12-27 DIAGNOSIS — R39.15 URINARY URGENCY: ICD-10-CM

## 2021-12-27 DIAGNOSIS — R31.29 HEMATURIA, MICROSCOPIC: ICD-10-CM

## 2021-12-27 DIAGNOSIS — R35.0 URINARY FREQUENCY: Primary | ICD-10-CM

## 2021-12-27 PROCEDURE — 1159F PR MEDICATION LIST DOCUMENTED IN MEDICAL RECORD: ICD-10-PCS | Mod: CPTII,S$GLB,, | Performed by: NURSE PRACTITIONER

## 2021-12-27 PROCEDURE — 1160F RVW MEDS BY RX/DR IN RCRD: CPT | Mod: CPTII,S$GLB,, | Performed by: NURSE PRACTITIONER

## 2021-12-27 PROCEDURE — 1159F MED LIST DOCD IN RCRD: CPT | Mod: CPTII,S$GLB,, | Performed by: NURSE PRACTITIONER

## 2021-12-27 PROCEDURE — 4010F ACE/ARB THERAPY RXD/TAKEN: CPT | Mod: CPTII,S$GLB,, | Performed by: NURSE PRACTITIONER

## 2021-12-27 PROCEDURE — 1160F PR REVIEW ALL MEDS BY PRESCRIBER/CLIN PHARMACIST DOCUMENTED: ICD-10-PCS | Mod: CPTII,S$GLB,, | Performed by: NURSE PRACTITIONER

## 2021-12-27 PROCEDURE — 99213 PR OFFICE/OUTPT VISIT, EST, LEVL III, 20-29 MIN: ICD-10-PCS | Mod: S$GLB,,, | Performed by: NURSE PRACTITIONER

## 2021-12-27 PROCEDURE — 4010F PR ACE/ARB THEARPY RXD/TAKEN: ICD-10-PCS | Mod: CPTII,S$GLB,, | Performed by: NURSE PRACTITIONER

## 2021-12-27 PROCEDURE — 99213 OFFICE O/P EST LOW 20 MIN: CPT | Mod: S$GLB,,, | Performed by: NURSE PRACTITIONER

## 2021-12-27 RX ORDER — BENZONATATE 100 MG/1
CAPSULE ORAL
COMMUNITY
Start: 2021-12-03 | End: 2022-07-26 | Stop reason: ALTCHOICE

## 2021-12-27 RX ORDER — ROSUVASTATIN CALCIUM 10 MG/1
TABLET, COATED ORAL
COMMUNITY
Start: 2021-09-29

## 2021-12-27 RX ORDER — AZELASTINE HCL 205.5 UG/1
SPRAY NASAL
COMMUNITY
Start: 2021-12-01 | End: 2024-01-11

## 2022-01-28 ENCOUNTER — TELEPHONE (OUTPATIENT)
Dept: GASTROENTEROLOGY | Facility: CLINIC | Age: 62
End: 2022-01-28
Payer: COMMERCIAL

## 2022-01-28 NOTE — TELEPHONE ENCOUNTER
----- Message from Lucy Marmolejo sent at 1/28/2022 11:50 AM CST -----  Regarding: issues  [phone]     Pt having issues and wants to speak w/ MA. Appt already ellie.    VL

## 2022-02-21 ENCOUNTER — OFFICE VISIT (OUTPATIENT)
Dept: GASTROENTEROLOGY | Facility: CLINIC | Age: 62
End: 2022-02-21
Payer: COMMERCIAL

## 2022-02-21 ENCOUNTER — TELEPHONE (OUTPATIENT)
Dept: GASTROENTEROLOGY | Facility: CLINIC | Age: 62
End: 2022-02-21

## 2022-02-21 VITALS
OXYGEN SATURATION: 98 % | HEIGHT: 67 IN | DIASTOLIC BLOOD PRESSURE: 83 MMHG | WEIGHT: 155 LBS | BODY MASS INDEX: 24.33 KG/M2 | HEART RATE: 60 BPM | SYSTOLIC BLOOD PRESSURE: 161 MMHG

## 2022-02-21 DIAGNOSIS — K58.1 IRRITABLE BOWEL SYNDROME WITH CONSTIPATION: Primary | ICD-10-CM

## 2022-02-21 DIAGNOSIS — Z86.010 PERSONAL HISTORY OF COLONIC POLYPS: ICD-10-CM

## 2022-02-21 DIAGNOSIS — R10.11 RUQ ABDOMINAL PAIN: ICD-10-CM

## 2022-02-21 DIAGNOSIS — R14.0 ABDOMINAL BLOATING: ICD-10-CM

## 2022-02-21 PROCEDURE — 99213 OFFICE O/P EST LOW 20 MIN: CPT | Mod: S$GLB,,, | Performed by: INTERNAL MEDICINE

## 2022-02-21 PROCEDURE — 3077F SYST BP >= 140 MM HG: CPT | Mod: CPTII,S$GLB,, | Performed by: INTERNAL MEDICINE

## 2022-02-21 PROCEDURE — 3008F PR BODY MASS INDEX (BMI) DOCUMENTED: ICD-10-PCS | Mod: CPTII,S$GLB,, | Performed by: INTERNAL MEDICINE

## 2022-02-21 PROCEDURE — 4010F PR ACE/ARB THEARPY RXD/TAKEN: ICD-10-PCS | Mod: CPTII,S$GLB,, | Performed by: INTERNAL MEDICINE

## 2022-02-21 PROCEDURE — 1160F RVW MEDS BY RX/DR IN RCRD: CPT | Mod: CPTII,S$GLB,, | Performed by: INTERNAL MEDICINE

## 2022-02-21 PROCEDURE — 3079F DIAST BP 80-89 MM HG: CPT | Mod: CPTII,S$GLB,, | Performed by: INTERNAL MEDICINE

## 2022-02-21 PROCEDURE — 1159F PR MEDICATION LIST DOCUMENTED IN MEDICAL RECORD: ICD-10-PCS | Mod: CPTII,S$GLB,, | Performed by: INTERNAL MEDICINE

## 2022-02-21 PROCEDURE — 3008F BODY MASS INDEX DOCD: CPT | Mod: CPTII,S$GLB,, | Performed by: INTERNAL MEDICINE

## 2022-02-21 PROCEDURE — 1159F MED LIST DOCD IN RCRD: CPT | Mod: CPTII,S$GLB,, | Performed by: INTERNAL MEDICINE

## 2022-02-21 PROCEDURE — 3077F PR MOST RECENT SYSTOLIC BLOOD PRESSURE >= 140 MM HG: ICD-10-PCS | Mod: CPTII,S$GLB,, | Performed by: INTERNAL MEDICINE

## 2022-02-21 PROCEDURE — 1160F PR REVIEW ALL MEDS BY PRESCRIBER/CLIN PHARMACIST DOCUMENTED: ICD-10-PCS | Mod: CPTII,S$GLB,, | Performed by: INTERNAL MEDICINE

## 2022-02-21 PROCEDURE — 99213 PR OFFICE/OUTPT VISIT, EST, LEVL III, 20-29 MIN: ICD-10-PCS | Mod: S$GLB,,, | Performed by: INTERNAL MEDICINE

## 2022-02-21 PROCEDURE — 4010F ACE/ARB THERAPY RXD/TAKEN: CPT | Mod: CPTII,S$GLB,, | Performed by: INTERNAL MEDICINE

## 2022-02-21 PROCEDURE — 3079F PR MOST RECENT DIASTOLIC BLOOD PRESSURE 80-89 MM HG: ICD-10-PCS | Mod: CPTII,S$GLB,, | Performed by: INTERNAL MEDICINE

## 2022-02-21 NOTE — LETTER
February 21, 2022        Matt Levy MD  [facility] [address] [place] [address]             [place] [facility]  [address] [place] [address]  Phone: [phone]  Fax: [phone]   Patient: Johanna Pichardo   MR Number: [id number]   YOB: 1960   Date of Visit: 2/21/2022       Dear Dr. Levy:    Thank you for referring Johanna Pichardo to me for evaluation. Attached you will find relevant portions of my assessment and plan of care.    If you have questions, please do not hesitate to call me. I look forward to following Johanna Pichardo along with you.    Sincerely,      Mikayla Hikcs MD            CC  No Recipients    Enclosure

## 2022-02-21 NOTE — PROGRESS NOTES
Clinic Note    Reason for visit:  The primary encounter diagnosis was Irritable bowel syndrome with constipation. Diagnoses of Abdominal bloating, RUQ abdominal pain, and Personal history of colonic polyps were also pertinent to this visit.    PCP: [facility]   [address] / [place] [address]    HPI:  This is a 61 y.o. female who was taken her callus as typical an open the [place] and ended up with a compression fraction of her lumbar spine.  She was given Toradol in 2 weeks afterwards she was still feeling the GI side effects from it.  But on AcipHex 20 mg by mouth daily.  Given tramadol but has not taken yet.  Still gets epigastric right upper quadrant discomfort.  Get sleepy from dicyclomine.  Takes it at night and still may help her through the following day.  Given amitriptyline 25 mg but has not started yet.  She will consider taking half a tablet nightly for 2 weeks before going to the full tablet.  Metamucil works better for her than the gummy    Last OV note:  Constipation: controlled with stool softener/probiotic  Pain, RUQ: intermittent, longstanding, regulated BMs with fiber with >75% improvement, PRN Bentyl helps but makes her sleepy (had liquid Bentyl and helps), may need rifaximin course, may need to consider amitriptyline (ECG ~2017 by PCP)  Screening for malignant neoplasms of colon: 2019 with one polyp  Liver function tests abnormal: 2019 LFT nl, get last set to see if liver work up needed.    Review of Systems   Constitutional: Negative for chills, diaphoresis, fatigue, fever and unexpected weight change.   HENT: Positive for postnasal drip. Negative for mouth sores, nosebleeds, sore throat, trouble swallowing and voice change.    Eyes: Positive for eye dryness. Negative for pain and discharge.   Respiratory: Negative for apnea, cough, choking, chest tightness, shortness of breath and wheezing.    Cardiovascular: Negative for chest pain, palpitations,  leg swelling and claudication.   Gastrointestinal: Positive for abdominal pain. Negative for abdominal distention, anal bleeding, blood in stool, change in bowel habit, constipation, diarrhea, nausea, rectal pain, vomiting, reflux, fecal incontinence and change in bowel habit.   Genitourinary: Positive for hematuria. Negative for bladder incontinence, difficulty urinating, dysuria, flank pain and frequency.   Musculoskeletal: Positive for back pain. Negative for arthralgias, joint swelling and joint deformity.   Integumentary:  Negative for color change, rash and wound.   Allergic/Immunologic: Positive for environmental allergies. Negative for food allergies.   Neurological: Negative for seizures, facial asymmetry, speech difficulty, weakness, headaches and memory loss.   Hematological: Negative for adenopathy. Does not bruise/bleed easily.   Psychiatric/Behavioral: Negative for agitation, behavioral problems, confusion, hallucinations and sleep disturbance.      Past Medical History:   Diagnosis Date    Allergy     Anxiety     GERD (gastroesophageal reflux disease)     High cholesterol     Hypertension      Past Surgical History:   Procedure Laterality Date    APPENDECTOMY      CYSTOSCOPY      OOPHORECTOMY       History reviewed. No pertinent family history.  Social History     Tobacco Use    Smoking status: Current Every Day Smoker     Packs/day: 0.25    Smokeless tobacco: Never Used   Substance Use Topics    Alcohol use: Yes    Drug use: Never     Review of patient's allergies indicates:  No Known Allergies   Medication List with Changes/Refills   Current Medications    ALREX 0.2 % DRPS        AMITRIPTYLINE (ELAVIL) 25 MG TABLET        AZELASTINE 205.5 MCG (0.15 %) SPRY        BENZONATATE (TESSALON) 100 MG CAPSULE        CLOBETASOL (TEMOVATE) 0.05 % CREAM        CLORAZEPATE (TRANXENE) 7.5 MG TAB        DICYCLOMINE (BENTYL) 10 MG/5 ML SYRUP        LOSARTAN (COZAAR) 25 MG TABLET        MELOXICAM (MOBIC)  "7.5 MG TABLET        MONTELUKAST (SINGULAIR) 10 MG TABLET        NYSTATIN-TRIAMCINOLONE (MYCOLOG II) CREAM        OMEPRAZOLE (PRILOSEC) 40 MG CAPSULE        OXYBUTYNIN (DITROPAN-XL) 10 MG 24 HR TABLET    Take 1 tablet (10 mg total) by mouth once daily.    RABEPRAZOLE (ACIPHEX) 20 MG TABLET        ROSUVASTATIN (CRESTOR) 10 MG TABLET        TERCONAZOLE (TERAZOL 7) 0.4 % CREA             Vital Signs:  BP (!) 161/83   Pulse 60   Ht 5' 7" (1.702 m)   Wt 70.3 kg (155 lb)   SpO2 98%   BMI 24.28 kg/m²   Body mass index is 24.28 kg/m².      Physical Exam  Vitals reviewed.   Constitutional:       General: She is awake. She is not in acute distress.     Appearance: Normal appearance. She is well-developed. She is not ill-appearing, toxic-appearing or diaphoretic.   HENT:      Head: Normocephalic and atraumatic.      Nose: Nose normal.      Mouth/Throat:      Mouth: Mucous membranes are moist.      Pharynx: Oropharynx is clear. No oropharyngeal exudate or posterior oropharyngeal erythema.   Eyes:      General: Lids are normal. Gaze aligned appropriately. No scleral icterus.        Right eye: No discharge.         Left eye: No discharge.      Extraocular Movements: Extraocular movements intact.      Conjunctiva/sclera: Conjunctivae normal.   Neck:      Trachea: Trachea normal.   Cardiovascular:      Rate and Rhythm: Normal rate and regular rhythm.      Pulses:           Radial pulses are 2+ on the right side and 2+ on the left side.   Pulmonary:      Effort: Pulmonary effort is normal. No respiratory distress.      Breath sounds: Normal breath sounds. No stridor. No wheezing or rhonchi.   Chest:      Chest wall: No tenderness.   Abdominal:      General: Abdomen is flat. Bowel sounds are normal. There is no distension.      Palpations: Abdomen is soft. There is no fluid wave, hepatomegaly or mass.      Tenderness: There is no abdominal tenderness. There is no guarding or rebound.   Musculoskeletal:         General: No " tenderness or deformity.      Cervical back: Full passive range of motion without pain and neck supple. No tenderness.      Right lower leg: No edema.      Left lower leg: No edema.   Lymphadenopathy:      Cervical: No cervical adenopathy.   Skin:     General: Skin is warm and dry.      Capillary Refill: Capillary refill takes less than 2 seconds.      Coloration: Skin is not cyanotic, jaundiced or pale.      Findings: No rash.   Neurological:      General: No focal deficit present.      Mental Status: She is alert and oriented to person, place, and time.      Cranial Nerves: No facial asymmetry.      Motor: No tremor.   Psychiatric:         Attention and Perception: Attention normal.         Mood and Affect: Mood and affect normal.         Speech: Speech normal.         Behavior: Behavior normal. Behavior is cooperative.          Labs: Pertinent labs reviewed.    All of the data above and below has been reviewed by myself and any further interpretations will be reflected in the assessment and plan.   The data includes review of external notes, and independent interpretation of lab results, procedures, x-rays, and imaging reports.      Assessment:  Irritable bowel syndrome with constipation  -     Ambulatory Referral to External Surgery    Abdominal bloating  -     Ambulatory Referral to External Surgery    RUQ abdominal pain  -     Ambulatory Referral to External Surgery    Personal history of colonic polyps    Next colonoscopy due in 2024 for history of polyps.  Has some extrinsic adhesions.  Will plan for upper endoscopy given persistent upper abdominal symptoms.     Gallbladder ultrasound and HIDA scan normal in 2019. I agree with the amitriptyline but start off at low dose.  This is empiric treatment for irritable bowel syndrome as well.  Okay to have the Metamucil and titrate that to bowel movements.  She is sensitive to medications in general.  Okay to take the dicyclomine at night especially if it makes her  sleepy then can use that for the benefit of sleep.    Recommendations:  Schedule upper endoscopy.  Begin low-dose amitriptyline.  Okay to take the dicyclomine and Metamucil as is.    Follow up in about 6 months (around 8/21/2022).    Risks, benefits, and alternatives of medical management, any associated procedures, and/or treatment discussed with the patient. Patient given opportunity to ask questions and voices understanding. Patient has elected to proceed with the recommended care modalities as discussed.    Order summary:  Orders Placed This Encounter   Procedures    Ambulatory Referral to External Surgery          Mikayla Hicks MD

## 2022-02-21 NOTE — PATIENT INSTRUCTIONS
Schedule upper endoscopy.  Begin low-dose amitriptyline.  Okay to take the dicyclomine and Metamucil as is.

## 2022-03-30 ENCOUNTER — TELEPHONE (OUTPATIENT)
Dept: GASTROENTEROLOGY | Facility: CLINIC | Age: 62
End: 2022-03-30
Payer: COMMERCIAL

## 2022-03-30 NOTE — TELEPHONE ENCOUNTER
----- Message from Shell Mariee sent at 3/30/2022 11:30 AM CDT -----  .Type:  Patient Returning Call    Who Called:self  Who Left Message for Patient:Marifer  Does the patient know what this is regarding?:yes  Would the patient rather a call back or a response via [facility]? Call back  Best Call Back Number:.[phone] (cell)    Additional Information:

## 2022-04-04 ENCOUNTER — OUTSIDE PLACE OF SERVICE (OUTPATIENT)
Dept: GASTROENTEROLOGY | Facility: CLINIC | Age: 62
End: 2022-04-04
Payer: COMMERCIAL

## 2022-04-04 DIAGNOSIS — K31.89 RETAINED FOOD IN STOMACH: ICD-10-CM

## 2022-04-04 DIAGNOSIS — R93.3 ABNORMAL FINDING ON GI TRACT IMAGING: ICD-10-CM

## 2022-04-04 DIAGNOSIS — R14.0 ABDOMINAL BLOATING: Primary | ICD-10-CM

## 2022-04-04 PROCEDURE — 43239 EGD BIOPSY SINGLE/MULTIPLE: CPT | Mod: ,,, | Performed by: INTERNAL MEDICINE

## 2022-04-04 PROCEDURE — 43239 PR EGD, FLEX, W/BIOPSY, SGL/MULTI: ICD-10-PCS | Mod: ,,, | Performed by: INTERNAL MEDICINE

## 2022-04-09 ENCOUNTER — TELEPHONE (OUTPATIENT)
Dept: GASTROENTEROLOGY | Facility: CLINIC | Age: 62
End: 2022-04-09
Payer: COMMERCIAL

## 2022-04-09 NOTE — TELEPHONE ENCOUNTER
DBx nl, GBx react w/o Hp. No signs of infection, precancerous cells or Celiac disease on the upper endoscopy biopsies.  Await GES report.  CMA to notify patient.  NBP

## 2022-04-12 NOTE — TELEPHONE ENCOUNTER
I left a voicemail with the results and asking the patient to contact us with any questions.  LEONELL, MARGRET

## 2022-04-22 RX ORDER — DICYCLOMINE HYDROCHLORIDE 10 MG/5ML
10 SOLUTION ORAL
Qty: 60 ML | Refills: 2 | Status: SHIPPED | OUTPATIENT
Start: 2022-04-22 | End: 2022-04-26 | Stop reason: SDUPTHER

## 2022-04-25 ENCOUNTER — TELEPHONE (OUTPATIENT)
Dept: GASTROENTEROLOGY | Facility: CLINIC | Age: 62
End: 2022-04-25
Payer: COMMERCIAL

## 2022-04-25 NOTE — TELEPHONE ENCOUNTER
----- Message from Renee Coles MA sent at 4/21/2022 12:36 PM CDT -----  Okay to schedule EGD per notes from NBP

## 2022-04-26 ENCOUNTER — TELEPHONE (OUTPATIENT)
Dept: GASTROENTEROLOGY | Facility: CLINIC | Age: 62
End: 2022-04-26
Payer: COMMERCIAL

## 2022-04-26 DIAGNOSIS — R93.3 ABNORMAL FINDING ON GI TRACT IMAGING: Primary | ICD-10-CM

## 2022-04-26 DIAGNOSIS — R14.0 ABDOMINAL BLOATING: ICD-10-CM

## 2022-04-26 DIAGNOSIS — K31.89 RETAINED FOOD IN STOMACH: ICD-10-CM

## 2022-04-26 RX ORDER — DICYCLOMINE HYDROCHLORIDE 10 MG/5ML
10 SOLUTION ORAL
Qty: 473 ML | Refills: 1 | Status: SHIPPED | OUTPATIENT
Start: 2022-04-26 | End: 2022-06-29 | Stop reason: SDUPTHER

## 2022-04-26 NOTE — TELEPHONE ENCOUNTER
----- Message from Barb De Anda sent at 4/26/2022  1:55 PM CDT -----  Patient returning call to nurse. Call back number [phone] (home)

## 2022-04-26 NOTE — TELEPHONE ENCOUNTER
"[facility]  [address] [place] [address]  Phone: [phone]  Fax: [phone]    History & Physical         Provider: Dr. Mikayla Hicks    Patient Name: Johanna CASTELLANOS (age):1960  61 y.o.           Gender: female   Phone: [phone]     Referring Physician: [facility]     Vital Signs:   Height - 5'7"  Weight - 155 lb  BMI -  24.3    Plan: EGD    Encounter Diagnoses   Name Primary?    Abnormal finding on GI tract imaging Yes    Retained food in stomach     Abdominal bloating            History:      Past Medical History:   Diagnosis Date    Allergy     Anxiety     GERD (gastroesophageal reflux disease)     High cholesterol     Hypertension       Past Surgical History:   Procedure Laterality Date    APPENDECTOMY      CYSTOSCOPY      OOPHORECTOMY        Medication List with Changes/Refills   Current Medications    ALREX 0.2 % DRPS        AMITRIPTYLINE (ELAVIL) 25 MG TABLET        AZELASTINE 205.5 MCG (0.15 %) SPRY        BENZONATATE (TESSALON) 100 MG CAPSULE        CLOBETASOL (TEMOVATE) 0.05 % CREAM        CLORAZEPATE (TRANXENE) 7.5 MG TAB        DICYCLOMINE (BENTYL) 10 MG/5 ML SYRUP    Take 5 mLs (10 mg total) by mouth 4 (four) times daily before meals and nightly.    LOSARTAN (COZAAR) 25 MG TABLET        MELOXICAM (MOBIC) 7.5 MG TABLET        MONTELUKAST (SINGULAIR) 10 MG TABLET        NYSTATIN-TRIAMCINOLONE (MYCOLOG II) CREAM        OMEPRAZOLE (PRILOSEC) 40 MG CAPSULE        OXYBUTYNIN (DITROPAN-XL) 10 MG 24 HR TABLET    Take 1 tablet (10 mg total) by mouth once daily.    RABEPRAZOLE (ACIPHEX) 20 MG TABLET        ROSUVASTATIN (CRESTOR) 10 MG TABLET        TERCONAZOLE (TERAZOL 7) 0.4 % CREA          Review of patient's allergies indicates:  No Known Allergies   No family history on file.   Social History     Tobacco Use    Smoking status: Current Every Day " Smoker     Packs/day: 0.25    Smokeless tobacco: Never Used   Substance Use Topics    Alcohol use: Yes    Drug use: Never        Physical Examination:     General Appearance:___________________________  HEENT: _____________________________________  Abdomen:____________________________________  Heart:________________________________________  Lungs:_______________________________________  Extremities:___________________________________  Skin:_________________________________________  Endocrine:____________________________________  Genitourinary:_________________________________  Neurological:__________________________________      Patient has been evaluated immediately prior to sedation and is medically cleared for endoscopy with IVCS as an ASA class: ______      Physician Signature: _________________________       Date: ________  Time: ________

## 2022-04-26 NOTE — TELEPHONE ENCOUNTER
----- Message from Shell Mariee sent at 4/25/2022  3:40 PM CDT -----  .Type:  Patient Returning Call    Who Called:self  Who Left Message for Patient:silverio  Does the patient know what this is regarding?:yes  Would the patient rather a call back or a response via [facility]? Call back  Best Call Back Number:.[phone]    Additional Information:

## 2022-04-26 NOTE — TELEPHONE ENCOUNTER
Returned call no answer left message for a return call to confirm date of procedure and go over instructions-REJI

## 2022-04-26 NOTE — TELEPHONE ENCOUNTER
----- Message from Urvashi Rodriguez sent at 4/26/2022  2:26 PM CDT -----  Regarding: Returning call  Contact: Pt  Type:  Patient Returning Call    Who Called: Johanna Pichardo  Who Left Message for Patient: Nicole  Does the patient know what this is regarding?:appt access   Would the patient rather a call back or a response via [facility]?  Call back   Best Call Back Number: [phone]  Additional Information:  Pt states that Monday is not a good time for her.

## 2022-05-12 ENCOUNTER — TELEPHONE (OUTPATIENT)
Dept: GASTROENTEROLOGY | Facility: CLINIC | Age: 62
End: 2022-05-12
Payer: COMMERCIAL

## 2022-05-12 NOTE — TELEPHONE ENCOUNTER
----- Message from Ally Bravo LPN sent at 5/10/2022 12:31 PM CDT -----    ----- Message -----  From: Usha Dominguez  Sent: 5/10/2022  11:37 AM CDT  To: Kurt Pichardo is calling to reschedule her colonoscopy to an early morning time. She said 2:30pm is too late and that she would have to starve herself for 2 days. She would like for Rhonda to give her a call back [phone]

## 2022-05-18 ENCOUNTER — OUTSIDE PLACE OF SERVICE (OUTPATIENT)
Dept: GASTROENTEROLOGY | Facility: CLINIC | Age: 62
End: 2022-05-18
Payer: COMMERCIAL

## 2022-05-18 LAB — EGD FOLLOW UP EXTERNAL: NORMAL

## 2022-05-18 PROCEDURE — 43235 PR EGD, FLEX, DIAGNOSTIC: ICD-10-PCS | Mod: ,,, | Performed by: INTERNAL MEDICINE

## 2022-05-18 PROCEDURE — 43235 EGD DIAGNOSTIC BRUSH WASH: CPT | Mod: ,,, | Performed by: INTERNAL MEDICINE

## 2022-06-29 ENCOUNTER — OFFICE VISIT (OUTPATIENT)
Dept: GASTROENTEROLOGY | Facility: CLINIC | Age: 62
End: 2022-06-29
Payer: COMMERCIAL

## 2022-06-29 VITALS
DIASTOLIC BLOOD PRESSURE: 83 MMHG | HEART RATE: 63 BPM | OXYGEN SATURATION: 98 % | BODY MASS INDEX: 23.49 KG/M2 | HEIGHT: 66 IN | WEIGHT: 146.19 LBS | SYSTOLIC BLOOD PRESSURE: 168 MMHG

## 2022-06-29 DIAGNOSIS — K21.9 GASTROESOPHAGEAL REFLUX DISEASE, UNSPECIFIED WHETHER ESOPHAGITIS PRESENT: ICD-10-CM

## 2022-06-29 DIAGNOSIS — K58.1 IRRITABLE BOWEL SYNDROME WITH CONSTIPATION: Primary | ICD-10-CM

## 2022-06-29 DIAGNOSIS — Z86.010 PERSONAL HISTORY OF COLONIC POLYPS: ICD-10-CM

## 2022-06-29 DIAGNOSIS — R10.11 RUQ ABDOMINAL PAIN: ICD-10-CM

## 2022-06-29 PROCEDURE — 3077F PR MOST RECENT SYSTOLIC BLOOD PRESSURE >= 140 MM HG: ICD-10-PCS | Mod: CPTII,S$GLB,, | Performed by: INTERNAL MEDICINE

## 2022-06-29 PROCEDURE — 3077F SYST BP >= 140 MM HG: CPT | Mod: CPTII,S$GLB,, | Performed by: INTERNAL MEDICINE

## 2022-06-29 PROCEDURE — 1160F PR REVIEW ALL MEDS BY PRESCRIBER/CLIN PHARMACIST DOCUMENTED: ICD-10-PCS | Mod: CPTII,S$GLB,, | Performed by: INTERNAL MEDICINE

## 2022-06-29 PROCEDURE — 3008F BODY MASS INDEX DOCD: CPT | Mod: CPTII,S$GLB,, | Performed by: INTERNAL MEDICINE

## 2022-06-29 PROCEDURE — 4010F ACE/ARB THERAPY RXD/TAKEN: CPT | Mod: CPTII,S$GLB,, | Performed by: INTERNAL MEDICINE

## 2022-06-29 PROCEDURE — 99213 PR OFFICE/OUTPT VISIT, EST, LEVL III, 20-29 MIN: ICD-10-PCS | Mod: S$GLB,,, | Performed by: INTERNAL MEDICINE

## 2022-06-29 PROCEDURE — 1160F RVW MEDS BY RX/DR IN RCRD: CPT | Mod: CPTII,S$GLB,, | Performed by: INTERNAL MEDICINE

## 2022-06-29 PROCEDURE — 99213 OFFICE O/P EST LOW 20 MIN: CPT | Mod: S$GLB,,, | Performed by: INTERNAL MEDICINE

## 2022-06-29 PROCEDURE — 4010F PR ACE/ARB THEARPY RXD/TAKEN: ICD-10-PCS | Mod: CPTII,S$GLB,, | Performed by: INTERNAL MEDICINE

## 2022-06-29 PROCEDURE — 1159F MED LIST DOCD IN RCRD: CPT | Mod: CPTII,S$GLB,, | Performed by: INTERNAL MEDICINE

## 2022-06-29 PROCEDURE — 3008F PR BODY MASS INDEX (BMI) DOCUMENTED: ICD-10-PCS | Mod: CPTII,S$GLB,, | Performed by: INTERNAL MEDICINE

## 2022-06-29 PROCEDURE — 1159F PR MEDICATION LIST DOCUMENTED IN MEDICAL RECORD: ICD-10-PCS | Mod: CPTII,S$GLB,, | Performed by: INTERNAL MEDICINE

## 2022-06-29 PROCEDURE — 3079F PR MOST RECENT DIASTOLIC BLOOD PRESSURE 80-89 MM HG: ICD-10-PCS | Mod: CPTII,S$GLB,, | Performed by: INTERNAL MEDICINE

## 2022-06-29 PROCEDURE — 3079F DIAST BP 80-89 MM HG: CPT | Mod: CPTII,S$GLB,, | Performed by: INTERNAL MEDICINE

## 2022-06-29 RX ORDER — DICYCLOMINE HYDROCHLORIDE 10 MG/5ML
10 SOLUTION ORAL
Qty: 473 ML | Refills: 1 | Status: SHIPPED | OUTPATIENT
Start: 2022-06-29 | End: 2022-11-23

## 2022-06-29 NOTE — PROGRESS NOTES
Clinic Note    Reason for visit:  The primary encounter diagnosis was Irritable bowel syndrome with constipation. Diagnoses of Gastroesophageal reflux disease, unspecified whether esophagitis present, RUQ abdominal pain, and Personal history of colonic polyps were also pertinent to this visit.    PCP: [facility]       HPI:  This is a 61 y.o. female who is being seen for a follow up visit. Patient reports constipation, takes fiber gummies daily to help with BMs. She reports daily BMs. Patient states  diagnosed with liver cancer about a month ago so has been stressed and not eating well. Patient reports taking omeprazole 40 daily and gas-X and her RUQ pain resolves.   Gallbladder ultrasound and HIDA scan normal in 2019. Patient has not tried amitriptyline yet due to  being ill and she does not want to get too sleepy.     4/20/2022: GES nl  EGD 4/4/2022: DBx nl, GBx react w/o Hp, retained food in stomach.  Last EGD 5/18/2022 with liquids the day prior: normal  Last colonoscopy 10/3/2019: 1 hyperp, repeat colon in 5y.    Review of Systems   Constitutional: Negative for chills, diaphoresis, fatigue, fever and unexpected weight change.   HENT: Negative for mouth sores, nosebleeds, postnasal drip, sore throat, trouble swallowing and voice change.    Eyes: Positive for eye dryness. Negative for pain and discharge.   Respiratory: Positive for cough. Negative for apnea, choking, chest tightness, shortness of breath and wheezing.    Cardiovascular: Negative for chest pain, palpitations, leg swelling and claudication.   Gastrointestinal: Positive for constipation. Negative for abdominal distention, abdominal pain, anal bleeding, blood in stool, change in bowel habit, diarrhea, nausea, rectal pain, vomiting, reflux, fecal incontinence and change in bowel habit.   Genitourinary: Negative for bladder incontinence, difficulty urinating, dysuria, flank pain, frequency and hematuria.    Musculoskeletal: Negative for arthralgias, back pain, joint swelling and joint deformity.   Integumentary:  Negative for color change, rash and wound.   Allergic/Immunologic: Positive for environmental allergies. Negative for food allergies.   Neurological: Negative for seizures, facial asymmetry, speech difficulty, weakness, headaches and memory loss.   Hematological: Negative for adenopathy. Does not bruise/bleed easily.   Psychiatric/Behavioral: Positive for agitation. Negative for behavioral problems, confusion, hallucinations and sleep disturbance.      Past Medical History:   Diagnosis Date    Allergy     Anxiety     GERD (gastroesophageal reflux disease)     High cholesterol     Hypertension      Past Surgical History:   Procedure Laterality Date    APPENDECTOMY      COLONOSCOPY  10/03/2019    1 hyperp, repeat colon in 5y.    CYSTOSCOPY      OOPHORECTOMY       History reviewed. No pertinent family history.  Social History     Tobacco Use    Smoking status: Current Every Day Smoker     Packs/day: 0.25    Smokeless tobacco: Never Used   Substance Use Topics    Alcohol use: Yes    Drug use: Never     Review of patient's allergies indicates:  No Known Allergies     Medication List with Changes/Refills   Current Medications    ALREX 0.2 % DRPS        AMITRIPTYLINE (ELAVIL) 25 MG TABLET        AZELASTINE 205.5 MCG (0.15 %) SPRY        BENZONATATE (TESSALON) 100 MG CAPSULE        CLOBETASOL (TEMOVATE) 0.05 % CREAM        CLORAZEPATE (TRANXENE) 7.5 MG TAB        LOSARTAN (COZAAR) 25 MG TABLET        MELOXICAM (MOBIC) 7.5 MG TABLET        MONTELUKAST (SINGULAIR) 10 MG TABLET        NYSTATIN-TRIAMCINOLONE (MYCOLOG II) CREAM        OMEPRAZOLE (PRILOSEC) 40 MG CAPSULE        OXYBUTYNIN (DITROPAN-XL) 10 MG 24 HR TABLET    Take 1 tablet (10 mg total) by mouth once daily.    RABEPRAZOLE (ACIPHEX) 20 MG TABLET        ROSUVASTATIN (CRESTOR) 10 MG TABLET        TERCONAZOLE (TERAZOL 7) 0.4 % CREA       Changed  "and/or Refilled Medications    Modified Medication Previous Medication    DICYCLOMINE (BENTYL) 10 MG/5 ML SYRUP dicyclomine (BENTYL) 10 mg/5 mL syrup       Take 5 mLs (10 mg total) by mouth 4 (four) times daily before meals and nightly.    Take 5 mLs (10 mg total) by mouth 4 (four) times daily before meals and nightly.         Vital Signs:  BP (!) 168/83   Pulse 63   Ht 5' 6" (1.676 m)   Wt 66.3 kg (146 lb 3.2 oz)   SpO2 98%   BMI 23.60 kg/m²         Physical Exam  Vitals reviewed.   Constitutional:       General: She is awake. She is not in acute distress.     Appearance: Normal appearance. She is well-developed. She is not ill-appearing, toxic-appearing or diaphoretic.   HENT:      Head: Normocephalic and atraumatic.      Nose: Nose normal.      Mouth/Throat:      Mouth: Mucous membranes are moist.      Pharynx: Oropharynx is clear. No oropharyngeal exudate or posterior oropharyngeal erythema.   Eyes:      General: Lids are normal. Gaze aligned appropriately. No scleral icterus.        Right eye: No discharge.         Left eye: No discharge.      Extraocular Movements: Extraocular movements intact.      Conjunctiva/sclera: Conjunctivae normal.   Neck:      Trachea: Trachea normal.   Cardiovascular:      Rate and Rhythm: Normal rate and regular rhythm.      Pulses:           Radial pulses are 2+ on the right side and 2+ on the left side.   Pulmonary:      Effort: Pulmonary effort is normal. No respiratory distress.      Breath sounds: Normal breath sounds. No stridor. No wheezing or rhonchi.   Chest:      Chest wall: No tenderness.   Abdominal:      General: Bowel sounds are normal. There is no distension.      Palpations: Abdomen is soft. There is no fluid wave, hepatomegaly or mass.      Tenderness: There is no abdominal tenderness. There is no guarding or rebound.   Musculoskeletal:         General: No tenderness or deformity.      Cervical back: Full passive range of motion without pain and neck supple. No " tenderness.      Right lower leg: No edema.      Left lower leg: No edema.   Lymphadenopathy:      Cervical: No cervical adenopathy.   Skin:     General: Skin is warm and dry.      Capillary Refill: Capillary refill takes less than 2 seconds.      Coloration: Skin is not cyanotic, jaundiced or pale.      Findings: No rash.   Neurological:      General: No focal deficit present.      Mental Status: She is alert and oriented to person, place, and time.      Cranial Nerves: No facial asymmetry.      Motor: No tremor.   Psychiatric:         Attention and Perception: Attention normal.         Mood and Affect: Mood and affect normal.         Speech: Speech normal.         Behavior: Behavior normal. Behavior is cooperative.            All of the data above and below has been reviewed by myself and any further interpretations will be reflected in the assessment and plan.   The data includes review of external notes, and independent interpretation of lab results, procedures, x-rays, and imaging reports.      Assessment:  Irritable bowel syndrome with constipation  -     dicyclomine (BENTYL) 10 mg/5 mL syrup; Take 5 mLs (10 mg total) by mouth 4 (four) times daily before meals and nightly.  Dispense: 473 mL; Refill: 1    Gastroesophageal reflux disease, unspecified whether esophagitis present    RUQ abdominal pain    Personal history of colonic polyps    Next colonoscopy due in 2024 for history of polyps.  Has some extrinsic adhesions.   RUQ pain relieved with omeprazole 40 mg daily and Gas-X every nightly.      Recommendations:  Continue taking fiber and omeprazole 40 mg daily. Begin Trulance samples and notify me if you would like a prescription sent.    Risks, benefits, and alternatives of medical management, any associated procedures, and/or treatment discussed with the patient. Patient given opportunity to ask questions and voices understanding. Patient has elected to proceed with the recommended care modalities as  discussed.    Follow up in about 6 months (around 12/29/2022).    Order summary:  No orders of the defined types were placed in this encounter.       Instructed patient to notify my office if they have not been contacted within two weeks after any procedures, submitting any samples (biopsies, blood, stool, urine, etc.) or after any imaging (X-ray, CT, MRI, etc.).     Mikayla Hicks MD    This document may have been created using a voice recognition transcribing system. Incorrect words or phrases may have been missed during proofreading. Please interpret accordingly or contact me for clarification.

## 2022-06-29 NOTE — PATIENT INSTRUCTIONS
Continue taking fiber and omeprazole 40 mg daily. Begin Trulance samples and notify me if you would like a prescription sent.

## 2022-06-29 NOTE — LETTER
June 29, 2022        [facility]  [address]  [address]  [place] [address]             [facility]  [address] [address]  Phone: [phone]  Fax: [phone]   Patient: Johanna Pichardo   MR Number: [id number]   YOB: 1960   Date of Visit: 6/29/2022       Dear Dr. Peng:    Thank you for referring Johanna Pichardo to me for evaluation. Attached you will find relevant portions of my assessment and plan of care.    If you have questions, please do not hesitate to call me. I look forward to following Johanna Pichardo along with you.    Sincerely,      Mikayla Hicks MD            CC  No Recipients    Enclosure

## 2022-07-26 ENCOUNTER — OFFICE VISIT (OUTPATIENT)
Dept: UROLOGY | Facility: CLINIC | Age: 62
End: 2022-07-26
Payer: COMMERCIAL

## 2022-07-26 VITALS
HEART RATE: 63 BPM | SYSTOLIC BLOOD PRESSURE: 151 MMHG | WEIGHT: 141 LBS | HEIGHT: 67 IN | DIASTOLIC BLOOD PRESSURE: 83 MMHG | TEMPERATURE: 98 F | RESPIRATION RATE: 20 BRPM | BODY MASS INDEX: 22.13 KG/M2

## 2022-07-26 DIAGNOSIS — R39.15 URINARY URGENCY: ICD-10-CM

## 2022-07-26 DIAGNOSIS — R31.29 HEMATURIA, MICROSCOPIC: ICD-10-CM

## 2022-07-26 DIAGNOSIS — R35.0 URINARY FREQUENCY: Primary | ICD-10-CM

## 2022-07-26 PROCEDURE — 3008F BODY MASS INDEX DOCD: CPT | Mod: CPTII,S$GLB,, | Performed by: NURSE PRACTITIONER

## 2022-07-26 PROCEDURE — 4010F PR ACE/ARB THEARPY RXD/TAKEN: ICD-10-PCS | Mod: CPTII,S$GLB,, | Performed by: NURSE PRACTITIONER

## 2022-07-26 PROCEDURE — 3079F DIAST BP 80-89 MM HG: CPT | Mod: CPTII,S$GLB,, | Performed by: NURSE PRACTITIONER

## 2022-07-26 PROCEDURE — 1159F PR MEDICATION LIST DOCUMENTED IN MEDICAL RECORD: ICD-10-PCS | Mod: CPTII,S$GLB,, | Performed by: NURSE PRACTITIONER

## 2022-07-26 PROCEDURE — 99214 OFFICE O/P EST MOD 30 MIN: CPT | Mod: S$GLB,,, | Performed by: NURSE PRACTITIONER

## 2022-07-26 PROCEDURE — 1160F PR REVIEW ALL MEDS BY PRESCRIBER/CLIN PHARMACIST DOCUMENTED: ICD-10-PCS | Mod: CPTII,S$GLB,, | Performed by: NURSE PRACTITIONER

## 2022-07-26 PROCEDURE — 1160F RVW MEDS BY RX/DR IN RCRD: CPT | Mod: CPTII,S$GLB,, | Performed by: NURSE PRACTITIONER

## 2022-07-26 PROCEDURE — 3079F PR MOST RECENT DIASTOLIC BLOOD PRESSURE 80-89 MM HG: ICD-10-PCS | Mod: CPTII,S$GLB,, | Performed by: NURSE PRACTITIONER

## 2022-07-26 PROCEDURE — 1159F MED LIST DOCD IN RCRD: CPT | Mod: CPTII,S$GLB,, | Performed by: NURSE PRACTITIONER

## 2022-07-26 PROCEDURE — 99214 PR OFFICE/OUTPT VISIT, EST, LEVL IV, 30-39 MIN: ICD-10-PCS | Mod: S$GLB,,, | Performed by: NURSE PRACTITIONER

## 2022-07-26 PROCEDURE — 3077F SYST BP >= 140 MM HG: CPT | Mod: CPTII,S$GLB,, | Performed by: NURSE PRACTITIONER

## 2022-07-26 PROCEDURE — 4010F ACE/ARB THERAPY RXD/TAKEN: CPT | Mod: CPTII,S$GLB,, | Performed by: NURSE PRACTITIONER

## 2022-07-26 PROCEDURE — 3077F PR MOST RECENT SYSTOLIC BLOOD PRESSURE >= 140 MM HG: ICD-10-PCS | Mod: CPTII,S$GLB,, | Performed by: NURSE PRACTITIONER

## 2022-07-26 PROCEDURE — 3008F PR BODY MASS INDEX (BMI) DOCUMENTED: ICD-10-PCS | Mod: CPTII,S$GLB,, | Performed by: NURSE PRACTITIONER

## 2022-07-26 NOTE — PROGRESS NOTES
Subjective:       Patient ID: Johanna Pichardo is a 61 y.o. female.    Chief Complaint: Urinary Frequency      HPI: 61-year-old female, established patient, presents for 6 month visit.  Patient has a history of frequency and urgency.  Would put the patient on oxybutynin XL 10 mg daily.  Patient is not taking at this time.  Patient has not been taking it.  She states she has lost some weight.  At this time she states her frequency and urgency is under control without medication.    Patient also history of microscopic hematuria.  Her urinalysis tend to show trace blood with red blood cells 0-2.  She denies any blood in her urine.      Since last visit patient has been has become ill.  He is having cirrhosis of the liver with issues.  He not a candidate for liver transplant.  He has also developed some cancer.  Patient admits lot of stress at this time.    She denies any pain or burning urination.  Denies any odor urine.  Denies any fever body aches.    No other urinary complaints this time.           Past Medical History:   Past Medical History:   Diagnosis Date    Allergy     Anxiety     GERD (gastroesophageal reflux disease)     High cholesterol     Hypertension        Past Surgical Historical:   Past Surgical History:   Procedure Laterality Date    APPENDECTOMY      COLONOSCOPY  10/03/2019    1 hyperp, repeat colon in 5y.    CYSTOSCOPY      OOPHORECTOMY          Medications:   Medication List with Changes/Refills   Current Medications    ALREX 0.2 % DRPS        AMITRIPTYLINE (ELAVIL) 25 MG TABLET        AZELASTINE 205.5 MCG (0.15 %) SPRY        BENZONATATE (TESSALON) 100 MG CAPSULE        CLOBETASOL (TEMOVATE) 0.05 % CREAM        CLORAZEPATE (TRANXENE) 7.5 MG TAB        DICYCLOMINE (BENTYL) 10 MG/5 ML SYRUP    Take 5 mLs (10 mg total) by mouth 4 (four) times daily before meals and nightly.    LOSARTAN (COZAAR) 25 MG TABLET        MELOXICAM (MOBIC) 7.5 MG TABLET        MONTELUKAST (SINGULAIR) 10 MG TABLET         NYSTATIN-TRIAMCINOLONE (MYCOLOG II) CREAM        OMEPRAZOLE (PRILOSEC) 40 MG CAPSULE        OXYBUTYNIN (DITROPAN-XL) 10 MG 24 HR TABLET    Take 1 tablet (10 mg total) by mouth once daily.    RABEPRAZOLE (ACIPHEX) 20 MG TABLET        ROSUVASTATIN (CRESTOR) 10 MG TABLET        TERCONAZOLE (TERAZOL 7) 0.4 % CREA            Past Social History:   Social History     Socioeconomic History    Marital status:    Tobacco Use    Smoking status: Current Every Day Smoker     Packs/day: 0.25    Smokeless tobacco: Never Used   Substance and Sexual Activity    Alcohol use: Yes    Drug use: Never    Sexual activity: Not Currently     Partners: Male       Allergies: Review of patient's allergies indicates:  No Known Allergies     Family History: History reviewed. No pertinent family history.     Review of Systems:  Review of Systems   Constitutional: Negative for activity change and appetite change.   HENT: Negative for congestion and dental problem.    Respiratory: Negative for chest tightness and shortness of breath.    Cardiovascular: Negative for chest pain.   Gastrointestinal: Negative for abdominal distention and abdominal pain.   Genitourinary: Negative for decreased urine volume, difficulty urinating, dyspareunia, dysuria, enuresis, flank pain, frequency, genital sores, hematuria, pelvic pain and urgency.   Musculoskeletal: Negative for back pain and neck pain.   Allergic/Immunologic: Negative for immunocompromised state.   Neurological: Negative for dizziness.   Hematological: Negative for adenopathy.   Psychiatric/Behavioral: Negative for agitation, behavioral problems and confusion.       Physical Exam:  Physical Exam  Vitals and nursing note reviewed.   Constitutional:       Appearance: She is well-developed.   HENT:      Head: Normocephalic.   Eyes:      Pupils: Pupils are equal, round, and reactive to light.   Cardiovascular:      Rate and Rhythm: Normal rate and regular rhythm.      Heart sounds:  Normal heart sounds.   Pulmonary:      Effort: Pulmonary effort is normal.      Breath sounds: Normal breath sounds.   Abdominal:      General: Bowel sounds are normal.      Palpations: Abdomen is soft.   Musculoskeletal:         General: Normal range of motion.      Cervical back: Normal range of motion and neck supple.   Skin:     General: Skin is warm and dry.   Neurological:      Mental Status: She is alert and oriented to person, place, and time.   Psychiatric:         Behavior: Behavior normal.       Urinalysis:  Small blood, red blood cells 0-2.    Assessment/Plan:   1. Frequency/urgency:  Patient has never started her oxybutynin XL 10 mg daily.  She states she is doing well with no medications.  Feels like her symptoms are well controlled at this time.    2. Microscopic hematuria:  Slight change in urinalysis.  Small blood as opposed to trace blood.  Red blood cells remain 0-2.  Will repeat UA at next visit.    Patient will follow-up in 6 months, sooner if needed.    Again patient is under lot of stress due to 's illness.  Problem List Items Addressed This Visit    None     Visit Diagnoses     Urinary frequency    -  Primary    Relevant Orders    POCT Urinalysis (w/Micro Option)    Urinary urgency        Relevant Orders    POCT Urinalysis (w/Micro Option)    Hematuria, microscopic        Relevant Orders    POCT Urinalysis (w/Micro Option)

## 2022-10-05 ENCOUNTER — CLINICAL SUPPORT (OUTPATIENT)
Dept: UROLOGY | Facility: CLINIC | Age: 62
End: 2022-10-05
Payer: COMMERCIAL

## 2022-10-05 NOTE — PROGRESS NOTES
Notified pt of urine drop off results. Pt stated that she understood the results and if things get worse she will contact us again. ED

## 2022-12-27 ENCOUNTER — TELEPHONE (OUTPATIENT)
Dept: GASTROENTEROLOGY | Facility: CLINIC | Age: 62
End: 2022-12-27
Payer: COMMERCIAL

## 2022-12-27 NOTE — TELEPHONE ENCOUNTER
----- Message from Samanta Fuentes sent at 12/27/2022  9:04 AM CST -----  Regarding: Medication  Contact: patient  Per phone call with patient Johanna she is wanting a prescription for medication Trulance that Dr Hicks sent her a sample to try out, she states the medicine worked and she needs a refill of it. Please return call at [phone] (home)   .  [id number] - [place], [place] - [address] [facility] AT [facility] [address] [facility] [address]  [address] [facility]  [place] [address]  Phone: [phone] Fax: [phone]

## 2022-12-30 DIAGNOSIS — K58.1 IRRITABLE BOWEL SYNDROME WITH CONSTIPATION: Primary | ICD-10-CM

## 2022-12-30 RX ORDER — PLECANATIDE 3 MG/1
3 TABLET ORAL DAILY
Qty: 90 TABLET | Refills: 1 | Status: SHIPPED | OUTPATIENT
Start: 2022-12-30 | End: 2023-01-10

## 2022-12-30 NOTE — TELEPHONE ENCOUNTER
----- Message from Jennifer Hugh sent at 12/30/2022  9:54 AM CST -----  Contact: self  Patient called Tues 12/27 requesting a prescription be called out for Trulance. She was given some samples by Dr Hicks and the medication is working. Please call out to [id number] - [place], [place] - [address] [facility] AT [facility] OF [facility] [address]  [address]  [place] [address]  Phone: [phone] Fax: [phone]       She didn't want to have to go back to taking her old medication since this sample worked for her. Please call back at [phone] if there are any issues with this request.

## 2023-01-10 ENCOUNTER — OFFICE VISIT (OUTPATIENT)
Dept: GASTROENTEROLOGY | Facility: CLINIC | Age: 63
End: 2023-01-10
Payer: COMMERCIAL

## 2023-01-10 VITALS
BODY MASS INDEX: 23.89 KG/M2 | WEIGHT: 148.63 LBS | HEIGHT: 66 IN | TEMPERATURE: 99 F | SYSTOLIC BLOOD PRESSURE: 151 MMHG | OXYGEN SATURATION: 96 % | DIASTOLIC BLOOD PRESSURE: 78 MMHG | HEART RATE: 61 BPM

## 2023-01-10 DIAGNOSIS — K58.1 IRRITABLE BOWEL SYNDROME WITH CONSTIPATION: Primary | ICD-10-CM

## 2023-01-10 DIAGNOSIS — Z86.010 PERSONAL HISTORY OF COLONIC POLYPS: ICD-10-CM

## 2023-01-10 DIAGNOSIS — R14.0 ABDOMINAL BLOATING: ICD-10-CM

## 2023-01-10 DIAGNOSIS — K21.9 GASTROESOPHAGEAL REFLUX DISEASE, UNSPECIFIED WHETHER ESOPHAGITIS PRESENT: ICD-10-CM

## 2023-01-10 PROBLEM — Z86.0100 PERSONAL HISTORY OF COLONIC POLYPS: Status: ACTIVE | Noted: 2023-01-10

## 2023-01-10 PROCEDURE — 3008F PR BODY MASS INDEX (BMI) DOCUMENTED: ICD-10-PCS | Mod: CPTII,S$GLB,, | Performed by: INTERNAL MEDICINE

## 2023-01-10 PROCEDURE — 1160F RVW MEDS BY RX/DR IN RCRD: CPT | Mod: CPTII,S$GLB,, | Performed by: INTERNAL MEDICINE

## 2023-01-10 PROCEDURE — 3077F SYST BP >= 140 MM HG: CPT | Mod: CPTII,S$GLB,, | Performed by: INTERNAL MEDICINE

## 2023-01-10 PROCEDURE — 1160F PR REVIEW ALL MEDS BY PRESCRIBER/CLIN PHARMACIST DOCUMENTED: ICD-10-PCS | Mod: CPTII,S$GLB,, | Performed by: INTERNAL MEDICINE

## 2023-01-10 PROCEDURE — 99214 PR OFFICE/OUTPT VISIT, EST, LEVL IV, 30-39 MIN: ICD-10-PCS | Mod: S$GLB,,, | Performed by: INTERNAL MEDICINE

## 2023-01-10 PROCEDURE — 3008F BODY MASS INDEX DOCD: CPT | Mod: CPTII,S$GLB,, | Performed by: INTERNAL MEDICINE

## 2023-01-10 PROCEDURE — 99214 OFFICE O/P EST MOD 30 MIN: CPT | Mod: S$GLB,,, | Performed by: INTERNAL MEDICINE

## 2023-01-10 PROCEDURE — 1159F PR MEDICATION LIST DOCUMENTED IN MEDICAL RECORD: ICD-10-PCS | Mod: CPTII,S$GLB,, | Performed by: INTERNAL MEDICINE

## 2023-01-10 PROCEDURE — 3078F DIAST BP <80 MM HG: CPT | Mod: CPTII,S$GLB,, | Performed by: INTERNAL MEDICINE

## 2023-01-10 PROCEDURE — 3078F PR MOST RECENT DIASTOLIC BLOOD PRESSURE < 80 MM HG: ICD-10-PCS | Mod: CPTII,S$GLB,, | Performed by: INTERNAL MEDICINE

## 2023-01-10 PROCEDURE — 1159F MED LIST DOCD IN RCRD: CPT | Mod: CPTII,S$GLB,, | Performed by: INTERNAL MEDICINE

## 2023-01-10 PROCEDURE — 3077F PR MOST RECENT SYSTOLIC BLOOD PRESSURE >= 140 MM HG: ICD-10-PCS | Mod: CPTII,S$GLB,, | Performed by: INTERNAL MEDICINE

## 2023-01-10 RX ORDER — PLECANATIDE 3 MG/1
3 TABLET ORAL DAILY
Qty: 90 TABLET | Refills: 4 | Status: SHIPPED | OUTPATIENT
Start: 2023-01-10 | End: 2023-01-26

## 2023-01-10 RX ORDER — DICYCLOMINE HYDROCHLORIDE 10 MG/5ML
10 SOLUTION ORAL
Qty: 473 ML | Refills: 3 | Status: SHIPPED | OUTPATIENT
Start: 2023-01-10 | End: 2024-02-19 | Stop reason: SDUPTHER

## 2023-01-10 RX ORDER — RABEPRAZOLE SODIUM 20 MG/1
20 TABLET, DELAYED RELEASE ORAL DAILY
Qty: 90 TABLET | Refills: 4 | Status: SHIPPED | OUTPATIENT
Start: 2023-01-10 | End: 2024-01-11 | Stop reason: SDUPTHER

## 2023-01-10 NOTE — PROGRESS NOTES
Clinic Note    Reason for visit:  The primary encounter diagnosis was Irritable bowel syndrome with constipation. Diagnoses of Gastroesophageal reflux disease, unspecified whether esophagitis present, Personal history of colonic polyps, and Abdominal bloating were also pertinent to this visit.    PCP: Matt Levy       HPI:  This is a 62 y.o. female who is being seen for a follow up. Patient with IBS-C..  She did get a chance to try some of the Trulance samples in the helped for the days that she had taken it.  The out-of-pocket cost was high and she did get the savings plan information to her pharmacy.  I will send a prescription for 90 day supply to see if the same plan will help with coverage.  After that time we will focus then on the AcipHex and hopefully try to minimize how much that she needs.  What she is a good bowel movement regimen she will try to go to every other day on the AcipHex.  The Bentyl liquids she has available but does not need to use routinely.  We will send refills so she has them.  No need to take the omeprazole while taking the AcipHex as they work the same way.  She is been through an unusual amount of stress lately both personal as well as physical.  She stays very active.    She has been working with her dermatologist to help out with the rash.  It comes and goes.  They have done biopsy.  The been trying to figure out if it is medication induced.  We are trying to not start any new medications on her.      4/20/2022: GES nl  EGD 4/4/2022: DBx nl, GBx react w/o Hp, retained food in stomach.  Last EGD 5/18/2022 with liquids the day prior: normal  Last colonoscopy 10/3/2019: 1 hyperp, repeat colon in 5y    Review of Systems   Constitutional:  Negative for chills, diaphoresis, fatigue, fever and unexpected weight change.   HENT:  Negative for mouth sores, nosebleeds, postnasal drip, sore throat, trouble swallowing and voice change.    Eyes:  Negative for pain, discharge and eye  dryness.   Respiratory:  Negative for apnea, cough, choking, chest tightness, shortness of breath and wheezing.    Cardiovascular:  Negative for chest pain, palpitations, leg swelling and claudication.   Gastrointestinal:  Positive for constipation and nausea. Negative for abdominal distention, abdominal pain, anal bleeding, blood in stool, change in bowel habit, diarrhea, rectal pain, vomiting, reflux, fecal incontinence and change in bowel habit.   Genitourinary:  Negative for bladder incontinence, difficulty urinating, dysuria, flank pain, frequency and hematuria.   Musculoskeletal:  Negative for arthralgias, back pain, joint swelling and joint deformity.   Integumentary:  Negative for color change, rash and wound.   Allergic/Immunologic: Negative for environmental allergies and food allergies.   Neurological:  Negative for seizures, facial asymmetry, speech difficulty, weakness, headaches and memory loss.   Hematological:  Negative for adenopathy. Does not bruise/bleed easily.   Psychiatric/Behavioral:  Negative for agitation, behavioral problems, confusion, hallucinations and sleep disturbance.       Past Medical History:   Diagnosis Date    Allergy     Anxiety     BMI 23.0-23.9, adult     Carpal tunnel syndrome     left arm    GERD (gastroesophageal reflux disease)     High cholesterol     Hypertension     Irritable bowel syndrome      Past Surgical History:   Procedure Laterality Date    APPENDECTOMY      COLONOSCOPY  10/03/2019    1 hyperp, repeat colon in 5y.    CYSTOSCOPY      ENDOSCOPY N/A     OOPHORECTOMY       Family History   Problem Relation Age of Onset    Colon cancer Neg Hx     Stomach cancer Neg Hx     Liver cancer Neg Hx     Liver disease Neg Hx     Pancreatic cancer Neg Hx     Esophageal cancer Neg Hx     Throat cancer Neg Hx     Ulcerative colitis Neg Hx     Crohn's disease Neg Hx      Social History     Tobacco Use    Smoking status: Every Day     Packs/day: 0.25     Types: Cigarettes     "Smokeless tobacco: Never   Substance Use Topics    Alcohol use: Yes     Alcohol/week: 7.0 standard drinks     Types: 4 Glasses of wine, 3 Shots of liquor per week    Drug use: Never     Review of patient's allergies indicates:   Allergen Reactions    Keflex [cephalexin] Rash        Medication List with Changes/Refills   Current Medications    ALREX 0.2 % DRPS        AMITRIPTYLINE (ELAVIL) 25 MG TABLET        AZELASTINE 205.5 MCG (0.15 %) SPRY        CLOBETASOL (TEMOVATE) 0.05 % CREAM        CLORAZEPATE (TRANXENE) 7.5 MG TAB        LOSARTAN (COZAAR) 25 MG TABLET        MONTELUKAST (SINGULAIR) 10 MG TABLET        NYSTATIN-TRIAMCINOLONE (MYCOLOG II) CREAM        ROSUVASTATIN (CRESTOR) 10 MG TABLET        TERCONAZOLE (TERAZOL 7) 0.4 % CREA       Changed and/or Refilled Medications    Modified Medication Previous Medication    DICYCLOMINE (BENTYL) 10 MG/5 ML SYRUP dicyclomine (BENTYL) 10 mg/5 mL syrup       Take 5 mLs (10 mg total) by mouth 4 (four) times daily before meals and nightly.    TAKE 5 ML(10 MG) BY MOUTH FOUR TIMES DAILY BEFORE MEALS AND AT NIGHT    PLECANATIDE (TRULANCE) 3 MG TAB plecanatide (TRULANCE) 3 mg Tab       Take 3 mg by mouth once daily.    Take 3 mg by mouth once daily.    RABEPRAZOLE (ACIPHEX) 20 MG TABLET RABEprazole (ACIPHEX) 20 mg tablet       Take 1 tablet (20 mg total) by mouth once daily.       Discontinued Medications    OMEPRAZOLE (PRILOSEC) 40 MG CAPSULE        OXYBUTYNIN (DITROPAN-XL) 10 MG 24 HR TABLET    TAKE 1 TABLET(10 MG) BY MOUTH EVERY DAY         Vital Signs:  BP (!) 151/78   Pulse 61   Temp 98.6 °F (37 °C)   Ht 5' 6" (1.676 m)   Wt 67.4 kg (148 lb 9.6 oz)   SpO2 96%   BMI 23.98 kg/m²         Physical Exam  Vitals reviewed.   Constitutional:       General: She is awake. She is not in acute distress.     Appearance: Normal appearance. She is well-developed. She is not ill-appearing, toxic-appearing or diaphoretic.   HENT:      Head: Normocephalic and atraumatic.      Nose: " Nose normal.      Mouth/Throat:      Mouth: Mucous membranes are moist.      Pharynx: Oropharynx is clear. No oropharyngeal exudate or posterior oropharyngeal erythema.   Eyes:      General: Lids are normal. Gaze aligned appropriately. No scleral icterus.        Right eye: No discharge.         Left eye: No discharge.      Extraocular Movements: Extraocular movements intact.      Conjunctiva/sclera: Conjunctivae normal.   Neck:      Trachea: Trachea normal.   Cardiovascular:      Rate and Rhythm: Normal rate and regular rhythm.      Pulses:           Radial pulses are 2+ on the right side and 2+ on the left side.   Pulmonary:      Effort: Pulmonary effort is normal. No respiratory distress.      Breath sounds: Normal breath sounds. No stridor. No wheezing or rhonchi.   Chest:      Chest wall: No tenderness.   Abdominal:      General: Bowel sounds are normal. There is no distension.      Palpations: Abdomen is soft. There is no fluid wave, hepatomegaly or mass.      Tenderness: There is no abdominal tenderness. There is no guarding or rebound.   Musculoskeletal:         General: No tenderness or deformity.      Cervical back: Full passive range of motion without pain and neck supple. No tenderness.      Right lower leg: No edema.      Left lower leg: No edema.   Lymphadenopathy:      Cervical: No cervical adenopathy.   Skin:     General: Skin is warm and dry.      Capillary Refill: Capillary refill takes less than 2 seconds.      Coloration: Skin is not cyanotic, jaundiced or pale.      Findings: Rash present.      Comments: On torso and all extremities with excoriations. Similar to hives but they last longer for her.   Neurological:      General: No focal deficit present.      Mental Status: She is alert and oriented to person, place, and time.      Cranial Nerves: No facial asymmetry.      Motor: No tremor.   Psychiatric:         Attention and Perception: Attention normal.         Mood and Affect: Mood and affect  normal.         Speech: Speech normal.         Behavior: Behavior normal. Behavior is cooperative.          All of the data above and below has been reviewed by myself and any further interpretations will be reflected in the assessment and plan.   The data includes review of external notes, and independent interpretation of lab results, procedures, x-rays, and imaging reports.      Assessment:  Irritable bowel syndrome with constipation  Comments:  Trulance samples helped, attempting Savings Plan for eRx  Orders:  -     plecanatide (TRULANCE) 3 mg Tab; Take 3 mg by mouth once daily.  Dispense: 90 tablet; Refill: 4  -     dicyclomine (BENTYL) 10 mg/5 mL syrup; Take 5 mLs (10 mg total) by mouth 4 (four) times daily before meals and nightly.  Dispense: 473 mL; Refill: 3    Gastroesophageal reflux disease, unspecified whether esophagitis present  Comments:  Aciphex 20 daily, may try to wean once good BM regimen  Orders:  -     RABEprazole (ACIPHEX) 20 mg tablet; Take 1 tablet (20 mg total) by mouth once daily.  Dispense: 90 tablet; Refill: 4    Personal history of colonic polyps  Comments:  Colonoscopy due 2024, extrinsic adehsions    Abdominal bloating    See above with plan.     Recommendations:  May continue AcipHex 20 mg daily.  Once having a good bowel movement regimen you may try taking it every other day.      Check if Trulance is better covered with the savings plan.  A prescription was sent to pharmacy.    May take the liquid dicyclomine as needed as you are.    Notify me sooner if any issues.    Risks, benefits, and alternatives of medical management, any associated procedures, and/or treatment discussed with the patient. Patient given opportunity to ask questions and voices understanding. Patient has elected to proceed with the recommended care modalities as discussed.    Follow up in about 1 year (around 1/10/2024).    Order summary:  No orders of the defined types were placed in this encounter.        Instructed patient to notify my office if they have not been contacted within two weeks after any procedures, submitting any samples (biopsies, blood, stool, urine, etc.) or after any imaging (X-ray, CT, MRI, etc.).     Mikayla Hicks MD    This document may have been created using a voice recognition transcribing system. Incorrect words or phrases may have been missed during proofreading. Please interpret accordingly or contact me for clarification.

## 2023-01-10 NOTE — LETTER
January 10, 2023        Matt Levy MD  [facility] [address] [place] [address]             [place] [facility]  [address] [address]  Phone: [phone]  Fax: [phone]   Patient: Johanna Pichardo   MR Number: [id number]   YOB: 1960   Date of Visit: 1/10/2023       Dear Dr. Levy:    Thank you for referring Johanna Pichardo to me for evaluation. Attached you will find relevant portions of my assessment and plan of care.    If you have questions, please do not hesitate to call me. I look forward to following Johanna Pichardo along with you.    Sincerely,      Miakyla Hicks MD            CC    No Recipients    Enclosure

## 2023-01-10 NOTE — PATIENT INSTRUCTIONS
May continue AcipHex 20 mg daily.  Once having a good bowel movement regimen you may try taking it every other day.      Check if Trulance is better covered with the savings plan.  A prescription was sent to pharmacy.    May take the liquid dicyclomine as needed as you are.    Notify me sooner if any issues.

## 2023-01-19 ENCOUNTER — TELEPHONE (OUTPATIENT)
Dept: GASTROENTEROLOGY | Facility: CLINIC | Age: 63
End: 2023-01-19
Payer: COMMERCIAL

## 2023-01-19 NOTE — TELEPHONE ENCOUNTER
----- Message from Eri Barron sent at 1/19/2023 11:13 AM CST -----  Contact: Patient  Type:  Patient Returning Call    Who Called:Johanna Pichardo  Who Left Message for Patient:Idalia  Does the patient know what this is regarding?: medication   Would the patient rather a call back or a response via [facility]? Call back   Best Call Back Number:[phone]  Additional Information: Patient states she tried using the discount card but it will not work.

## 2023-01-19 NOTE — TELEPHONE ENCOUNTER
----- Message from Ally Bravo LPN sent at 1/18/2023  5:20 PM CST -----  Contact: self    ----- Message -----  From: Reema Jacobs  Sent: 1/18/2023   4:13 PM CST  To: Kurt ALMEIDA Staff    Type - Medication Issue     Pharmacy is stating plecanatide (TRULANCE) 3 mg Tab will not be covered by patient's insurance and they faxed over other medications to clinic for Dr Hicks to review, will you see if you can confirm this and let patient know @ [phone]?

## 2023-01-25 ENCOUNTER — TELEPHONE (OUTPATIENT)
Dept: GASTROENTEROLOGY | Facility: CLINIC | Age: 63
End: 2023-01-25
Payer: COMMERCIAL

## 2023-01-25 NOTE — TELEPHONE ENCOUNTER
----- Message from Alonzo Parsons MA sent at 1/24/2023  5:02 PM CST -----  Contact: Patient    ----- Message -----  From: Eriilana Barron  Sent: 1/24/2023   3:17 PM CST  To: Kurt ALMEIDA Staff    Patient called to consult with nurse or staff regarding the medication she was prescribed. She states her insurance would not cover the medication and recommended Linzess and a few other medications. She states she would like a call back regarding this and can be reached at [phone] or [phone]. Thanks/MR

## 2023-01-25 NOTE — TELEPHONE ENCOUNTER
From notes I've read, it doesn't seem she has tried Linzess yet. Okay to give samples of 145 mcg and have her try this. We can also just send Linzess to her pharmacy for her to try if she does not want samples first. We can see if better covered than Trulance.   MCL

## 2023-01-26 ENCOUNTER — OFFICE VISIT (OUTPATIENT)
Dept: UROLOGY | Facility: CLINIC | Age: 63
End: 2023-01-26
Payer: COMMERCIAL

## 2023-01-26 ENCOUNTER — TELEPHONE (OUTPATIENT)
Dept: GASTROENTEROLOGY | Facility: CLINIC | Age: 63
End: 2023-01-26
Payer: COMMERCIAL

## 2023-01-26 VITALS — DIASTOLIC BLOOD PRESSURE: 89 MMHG | HEART RATE: 59 BPM | SYSTOLIC BLOOD PRESSURE: 176 MMHG

## 2023-01-26 DIAGNOSIS — R31.29 HEMATURIA, MICROSCOPIC: ICD-10-CM

## 2023-01-26 DIAGNOSIS — R35.0 URINARY FREQUENCY: Primary | ICD-10-CM

## 2023-01-26 DIAGNOSIS — R39.15 URINARY URGENCY: ICD-10-CM

## 2023-01-26 PROCEDURE — 1159F MED LIST DOCD IN RCRD: CPT | Mod: CPTII,S$GLB,, | Performed by: NURSE PRACTITIONER

## 2023-01-26 PROCEDURE — 3077F SYST BP >= 140 MM HG: CPT | Mod: CPTII,S$GLB,, | Performed by: NURSE PRACTITIONER

## 2023-01-26 PROCEDURE — 3077F PR MOST RECENT SYSTOLIC BLOOD PRESSURE >= 140 MM HG: ICD-10-PCS | Mod: CPTII,S$GLB,, | Performed by: NURSE PRACTITIONER

## 2023-01-26 PROCEDURE — 99214 PR OFFICE/OUTPT VISIT, EST, LEVL IV, 30-39 MIN: ICD-10-PCS | Mod: S$GLB,,, | Performed by: NURSE PRACTITIONER

## 2023-01-26 PROCEDURE — 3079F PR MOST RECENT DIASTOLIC BLOOD PRESSURE 80-89 MM HG: ICD-10-PCS | Mod: CPTII,S$GLB,, | Performed by: NURSE PRACTITIONER

## 2023-01-26 PROCEDURE — 1160F RVW MEDS BY RX/DR IN RCRD: CPT | Mod: CPTII,S$GLB,, | Performed by: NURSE PRACTITIONER

## 2023-01-26 PROCEDURE — 1159F PR MEDICATION LIST DOCUMENTED IN MEDICAL RECORD: ICD-10-PCS | Mod: CPTII,S$GLB,, | Performed by: NURSE PRACTITIONER

## 2023-01-26 PROCEDURE — 99214 OFFICE O/P EST MOD 30 MIN: CPT | Mod: S$GLB,,, | Performed by: NURSE PRACTITIONER

## 2023-01-26 PROCEDURE — 1160F PR REVIEW ALL MEDS BY PRESCRIBER/CLIN PHARMACIST DOCUMENTED: ICD-10-PCS | Mod: CPTII,S$GLB,, | Performed by: NURSE PRACTITIONER

## 2023-01-26 PROCEDURE — 3079F DIAST BP 80-89 MM HG: CPT | Mod: CPTII,S$GLB,, | Performed by: NURSE PRACTITIONER

## 2023-01-26 RX ORDER — HYDROXYZINE HYDROCHLORIDE 25 MG/1
25 TABLET, FILM COATED ORAL
COMMUNITY
Start: 2022-12-06

## 2023-01-26 RX ORDER — TINIDAZOLE 500 MG/1
500 TABLET ORAL
COMMUNITY
Start: 2022-11-14

## 2023-01-26 RX ORDER — FLUOCINONIDE 0.5 MG/G
30 CREAM TOPICAL
COMMUNITY
Start: 2022-10-21

## 2023-01-26 NOTE — PROGRESS NOTES
Subjective:       Patient ID: Johanna Pichardo is a 62 y.o. female.    Chief Complaint: Urinary Frequency      HPI: 62-year-old female, established patient, presents for 6 month visit.    Patient has history of frequency and urgency.    We did put the patient on oxybutynin XL 15 mg daily.  However, patient did not start the medication.  She feels like she is doing well without the medicine.    At this time she continues to do well with some rare occurrences of frequency and urgency.      Last visit patient's  passed away.  At last visit she was under lot of stress due to his medical conditions.    She is currently having some IBS issues.  She is working with [employer] to improve her symptoms.  She does attributes some of her frequency and urgency due to her GI symptoms.      Patient also has history of microscopic hematuria.    Last urinalysis showed small blood.    She did have a negative cysto in 2020.    No other urinary complaints at this time.         Past Medical History:   Past Medical History:   Diagnosis Date    Allergy     Anxiety     BMI 23.0-23.9, adult     Carpal tunnel syndrome     left arm    GERD (gastroesophageal reflux disease)     High cholesterol     Hypertension     Irritable bowel syndrome        Past Surgical Historical:   Past Surgical History:   Procedure Laterality Date    APPENDECTOMY      COLONOSCOPY  10/03/2019    1 hyperp, repeat colon in 5y.    CYSTOSCOPY      ENDOSCOPY N/A     OOPHORECTOMY          Medications:   Medication List with Changes/Refills   Current Medications    ALREX 0.2 % DRPS        AMITRIPTYLINE (ELAVIL) 25 MG TABLET        AZELASTINE 205.5 MCG (0.15 %) SPRY        CLOBETASOL (TEMOVATE) 0.05 % CREAM        CLORAZEPATE (TRANXENE) 7.5 MG TAB        DICYCLOMINE (BENTYL) 10 MG/5 ML SYRUP    Take 5 mLs (10 mg total) by mouth 4 (four) times daily before meals and nightly.    FLUOCINONIDE 0.05% (LIDEX) 0.05 % CREAM    Apply 30 g topically as needed.    HYDROXYZINE HCL (ATARAX)  25 MG TABLET    Take 25 mg by mouth as needed.    MONTELUKAST (SINGULAIR) 10 MG TABLET        NYSTATIN-TRIAMCINOLONE (MYCOLOG II) CREAM        RABEPRAZOLE (ACIPHEX) 20 MG TABLET    Take 1 tablet (20 mg total) by mouth once daily.    ROSUVASTATIN (CRESTOR) 10 MG TABLET        TERCONAZOLE (TERAZOL 7) 0.4 % CREA        TINIDAZOLE (TINDAMAX) 500 MG TABLET    Take 500 mg by mouth as needed.   Discontinued Medications    LOSARTAN (COZAAR) 25 MG TABLET        PLECANATIDE (TRULANCE) 3 MG TAB    Take 3 mg by mouth once daily.        Past Social History:   Social History     Socioeconomic History    Marital status:    Tobacco Use    Smoking status: Every Day     Packs/day: 0.25     Types: Cigarettes    Smokeless tobacco: Never   Substance and Sexual Activity    Alcohol use: Yes     Alcohol/week: 7.0 standard drinks     Types: 4 Glasses of wine, 3 Shots of liquor per week    Drug use: Never    Sexual activity: Not Currently     Partners: Male       Allergies:   Review of patient's allergies indicates:   Allergen Reactions    Losartan Itching    Keflex [cephalexin] Rash        Family History:   Family History   Problem Relation Age of Onset    Colon cancer Neg Hx     Stomach cancer Neg Hx     Liver cancer Neg Hx     Liver disease Neg Hx     Pancreatic cancer Neg Hx     Esophageal cancer Neg Hx     Throat cancer Neg Hx     Ulcerative colitis Neg Hx     Crohn's disease Neg Hx         Review of Systems:  Review of Systems   Constitutional:  Negative for activity change and appetite change.   HENT:  Negative for congestion and dental problem.    Respiratory:  Negative for chest tightness and shortness of breath.    Cardiovascular:  Negative for chest pain.   Gastrointestinal:  Negative for abdominal distention and abdominal pain.   Genitourinary:  Positive for frequency, hematuria and urgency. Negative for decreased urine volume, difficulty urinating, dyspareunia, dysuria, enuresis, flank pain, genital sores and pelvic pain.    Musculoskeletal:  Negative for back pain and neck pain.   Allergic/Immunologic: Negative for immunocompromised state.   Neurological:  Negative for dizziness.   Hematological:  Negative for adenopathy.   Psychiatric/Behavioral:  Negative for agitation, behavioral problems and confusion.      Physical Exam:  Physical Exam  Vitals and nursing note reviewed.   Constitutional:       Appearance: She is well-developed.   HENT:      Head: Normocephalic.   Eyes:      Pupils: Pupils are equal, round, and reactive to light.   Cardiovascular:      Rate and Rhythm: Normal rate and regular rhythm.      Heart sounds: Normal heart sounds.   Pulmonary:      Effort: Pulmonary effort is normal.      Breath sounds: Normal breath sounds.   Abdominal:      General: Bowel sounds are normal.      Palpations: Abdomen is soft.   Musculoskeletal:         General: Normal range of motion.      Cervical back: Normal range of motion and neck supple.   Skin:     General: Skin is warm and dry.   Neurological:      Mental Status: She is alert and oriented to person, place, and time.   Psychiatric:         Mood and Affect: Mood normal.         Behavior: Behavior normal.     Urinalysis: Small blood, red blood cells 0-3.    Assessment/Plan:   1. Frequency/urgency:  Patient is not on oxybutynin at this time.    Does complain of some occasional issues which she correlates with her IBS.    At this time patient will stay off oxybutynin.    Will readdress once IBS is managed.    2. Microscopic hematuria:  No significant changes.    Small blood with red blood cells 0-3.  Negative cysto in 2020.    Will recheck UA at next visit.      Follow-up 6 months, sooner if  Problem List Items Addressed This Visit    None  Visit Diagnoses       Urinary frequency    -  Primary    Relevant Orders    POCT Urinalysis (w/Micro Option)    Urinary urgency        Relevant Orders    POCT Urinalysis (w/Micro Option)    Hematuria, microscopic        Relevant Orders    POCT  Urinalysis (w/Micro Option)

## 2023-01-26 NOTE — TELEPHONE ENCOUNTER
----- Message from Urvashi Rodriguez sent at 1/26/2023  8:04 AM CST -----  Regarding: returning call  Contact: Pt  Type:  Patient Returning Call    Who Called: Johanna Pichardo   Who Left Message for Patient: Idalia   Does the patient know what this is regarding?: samples of Linzess   Would the patient rather a call back or a response via [facility]?  Call back   Best Call Back Number: [phone]  Additional Information:  Pt is calling to see if the samples could be left at the . States that she will be there around 12 to .

## 2023-02-15 ENCOUNTER — TELEPHONE (OUTPATIENT)
Dept: GASTROENTEROLOGY | Facility: CLINIC | Age: 63
End: 2023-02-15
Payer: COMMERCIAL

## 2023-02-15 NOTE — TELEPHONE ENCOUNTER
Any update from the [employer] samples? If works well, then can send eRx and she should keep her follow up OV as a back up and notify me sooner if any issues.  FLAKITA

## 2023-02-17 NOTE — TELEPHONE ENCOUNTER
Pt called back to report that she has not been able to take the medication yet. She says she went to see a dermatologist about a rash and stopped some of her medications to see if they was causing the rash. She continued with the rash and has had a biopsy. Pt states she plans on trying the Linzess as soon as her situation with the rash is resolved and she will call us with an update once she is able to take the samples.

## 2023-03-27 ENCOUNTER — DOCUMENTATION ONLY (OUTPATIENT)
Dept: GASTROENTEROLOGY | Facility: CLINIC | Age: 63
End: 2023-03-27
Payer: COMMERCIAL

## 2023-04-02 ENCOUNTER — TELEPHONE (OUTPATIENT)
Dept: GASTROENTEROLOGY | Facility: CLINIC | Age: 63
End: 2023-04-02
Payer: COMMERCIAL

## 2023-04-02 NOTE — TELEPHONE ENCOUNTER
Notify patient that I got a notice from her plan stating that I prescribed her rabeprazole (AcipHex generic) and Dr. Levy sent her omeprazole a few days ago.  No need for both since they work the same way. When I saw her in clinic she reported taking rabeprazole 20 daily.  NBP

## 2023-04-04 ENCOUNTER — TELEPHONE (OUTPATIENT)
Dept: GASTROENTEROLOGY | Facility: CLINIC | Age: 63
End: 2023-04-04
Payer: COMMERCIAL

## 2023-04-04 NOTE — TELEPHONE ENCOUNTER
----- Message from Teresita De León sent at 4/4/2023  1:27 PM CDT -----  Regarding: Medication  Contact: patient  Per phone call with patient, she stated that she will start taking the Linzess and she will call back and make an appointment.  If additional information is needed please contact [phone].    Thanks,  SJ

## 2023-04-05 NOTE — TELEPHONE ENCOUNTER
See notes from 4/2/2023 encounter. We were not attempting to reach her regarding the Linzess. Rather her PPI.  NBP

## 2023-06-01 ENCOUNTER — TELEPHONE (OUTPATIENT)
Dept: GASTROENTEROLOGY | Facility: CLINIC | Age: 63
End: 2023-06-01
Payer: COMMERCIAL

## 2023-06-01 NOTE — TELEPHONE ENCOUNTER
----- Message from Teresita De León sent at 6/1/2023 11:45 AM CDT -----  Regarding: Medication  Contact: patient  Per phone call with patient she stated that she tried the Linzess and she was advised of the Patient Assistance Program and she need to make sure and have someone to go over the paper work with her.  She did not know if she needed to make an appointment or this can be handle over the phone.  Please return call at [phone] (home) or [phone] (cell).  The caller wanted to know if the office has samples.    Thanks,  SJ

## 2023-06-06 ENCOUNTER — TELEPHONE (OUTPATIENT)
Dept: GASTROENTEROLOGY | Facility: CLINIC | Age: 63
End: 2023-06-06
Payer: COMMERCIAL

## 2023-06-07 NOTE — TELEPHONE ENCOUNTER
Will given samples of linzess 145mcg at front to be picked up today. She should complete patient assistance program as planned so she can  prescription.  ELVA

## 2023-06-08 NOTE — TELEPHONE ENCOUNTER
I spoke to patient and she picked up Linzess and filled out assistance form.  Will call if she needs anything

## 2023-06-21 ENCOUNTER — TELEPHONE (OUTPATIENT)
Dept: GASTROENTEROLOGY | Facility: CLINIC | Age: 63
End: 2023-06-21
Payer: COMMERCIAL

## 2023-06-21 NOTE — TELEPHONE ENCOUNTER
----- Message from Mariola Durham MA sent at 6/21/2023  9:21 AM CDT -----  Regarding: FW: Patient Assistance Program  Contact: patient    ----- Message -----  From: Teresita De León  Sent: 6/21/2023   8:39 AM CDT  To: Eliseo Moyer Staff  Subject: Patient Assistance Program                       Per phone call with patient she stated that she dropped off the paper work for the Patient Assistance Program for the Linzess and it was faxed to their office but the physician information was not filled out and so she has to bring another copy of the form to be filled out again and she is out of the samples that was given.   She will be bring them tomorrow and the copy of the letter that was sent to her.  Please return call at [phone] (home).    Thanks,  SJ

## 2023-06-22 RX ORDER — VALACYCLOVIR HYDROCHLORIDE 1 G/1
TABLET, FILM COATED ORAL
COMMUNITY
Start: 2023-04-04

## 2023-06-22 RX ORDER — OXYBUTYNIN CHLORIDE 10 MG/1
TABLET, EXTENDED RELEASE ORAL
COMMUNITY
Start: 2023-05-24 | End: 2023-06-22 | Stop reason: SDUPTHER

## 2023-06-22 RX ORDER — OXYBUTYNIN CHLORIDE 10 MG/1
10 TABLET, EXTENDED RELEASE ORAL DAILY
Qty: 30 TABLET | Refills: 1 | Status: SHIPPED | OUTPATIENT
Start: 2023-06-22 | End: 2023-06-26 | Stop reason: SDUPTHER

## 2023-06-22 RX ORDER — LOSARTAN POTASSIUM 25 MG/1
TABLET ORAL
COMMUNITY
Start: 2023-06-12

## 2023-06-22 RX ORDER — OMEPRAZOLE 40 MG/1
CAPSULE, DELAYED RELEASE ORAL
COMMUNITY
Start: 2023-06-12 | End: 2024-01-11

## 2023-06-22 RX ORDER — TERCONAZOLE 8 MG/G
CREAM VAGINAL
COMMUNITY
Start: 2023-04-18

## 2023-06-22 RX ORDER — METOPROLOL SUCCINATE 50 MG/1
TABLET, EXTENDED RELEASE ORAL
COMMUNITY
Start: 2023-01-26 | End: 2024-01-11

## 2023-06-22 RX ORDER — HYDROXYCHLOROQUINE SULFATE 200 MG/1
TABLET, FILM COATED ORAL
COMMUNITY
Start: 2023-04-25 | End: 2024-01-11

## 2023-06-22 RX ORDER — MYCOPHENOLATE MOFETIL 500 MG/1
TABLET ORAL
COMMUNITY
Start: 2023-02-23 | End: 2024-01-11

## 2023-06-22 RX ORDER — AMLODIPINE BESYLATE 2.5 MG/1
TABLET ORAL
COMMUNITY
Start: 2023-02-23 | End: 2024-01-11

## 2023-06-22 RX ORDER — LOSARTAN POTASSIUM 50 MG/1
TABLET ORAL
COMMUNITY
Start: 2023-05-18

## 2023-06-26 RX ORDER — OXYBUTYNIN CHLORIDE 10 MG/1
10 TABLET, EXTENDED RELEASE ORAL DAILY
Qty: 90 TABLET | Refills: 0 | Status: SHIPPED | OUTPATIENT
Start: 2023-06-26

## 2023-07-17 ENCOUNTER — TELEPHONE (OUTPATIENT)
Dept: GASTROENTEROLOGY | Facility: CLINIC | Age: 63
End: 2023-07-17
Payer: COMMERCIAL

## 2023-07-17 DIAGNOSIS — K59.04 CHRONIC IDIOPATHIC CONSTIPATION: Primary | ICD-10-CM

## 2023-07-18 NOTE — TELEPHONE ENCOUNTER
I do not see where we have sent a prescription of Linzess in the past. I know she was given samples but I don't see where we actually sent the prescription. Her insurance should cover the Linzess. She can also bring coupon to pharmacy. I will send Linzess 145 mcg to pharmacy and she should check to see if cost prohibitive. If so, she will need to get a coupon/co-pay card for the Linzess either through the website or I believe we have a few in office.   MLC

## 2023-07-21 ENCOUNTER — TELEPHONE (OUTPATIENT)
Dept: GASTROENTEROLOGY | Facility: CLINIC | Age: 63
End: 2023-07-21
Payer: COMMERCIAL

## 2023-07-21 NOTE — TELEPHONE ENCOUNTER
Returned pt call and left v/m that I see were her Rx for Linzess 145 mcg was called out to [facility] [place] on 7/18/23. If she has any other questions to call back. cristal

## 2023-07-21 NOTE — TELEPHONE ENCOUNTER
----- Message from Lolly Tello sent at 7/21/2023 11:16 AM CDT -----  Patient is returning call to Idalia..Please call her back at ..[phone]

## 2023-07-26 ENCOUNTER — OFFICE VISIT (OUTPATIENT)
Dept: UROLOGY | Facility: CLINIC | Age: 63
End: 2023-07-26
Payer: COMMERCIAL

## 2023-07-26 VITALS — RESPIRATION RATE: 18 BRPM | BODY MASS INDEX: 23.14 KG/M2 | HEIGHT: 66 IN | WEIGHT: 144 LBS

## 2023-07-26 DIAGNOSIS — R35.0 URINARY FREQUENCY: Primary | ICD-10-CM

## 2023-07-26 DIAGNOSIS — R31.29 HEMATURIA, MICROSCOPIC: ICD-10-CM

## 2023-07-26 DIAGNOSIS — R39.15 URINARY URGENCY: ICD-10-CM

## 2023-07-26 LAB
BILIRUBIN, UA POC OHS: NEGATIVE
BLOOD, UA POC OHS: ABNORMAL
CLARITY, UA POC OHS: CLEAR
COLOR, UA POC OHS: YELLOW
GLUCOSE, UA POC OHS: NEGATIVE
KETONES, UA POC OHS: NEGATIVE
LEUKOCYTES, UA POC OHS: NEGATIVE
NITRITE, UA POC OHS: NEGATIVE
PH, UA POC OHS: 5.5
PROTEIN, UA POC OHS: NEGATIVE
SPECIFIC GRAVITY, UA POC OHS: 1.01
UROBILINOGEN, UA POC OHS: 0.2

## 2023-07-26 PROCEDURE — 81003 POCT URINALYSIS(INSTRUMENT): ICD-10-PCS | Mod: QW,S$GLB,, | Performed by: NURSE PRACTITIONER

## 2023-07-26 PROCEDURE — 1159F PR MEDICATION LIST DOCUMENTED IN MEDICAL RECORD: ICD-10-PCS | Mod: CPTII,S$GLB,, | Performed by: NURSE PRACTITIONER

## 2023-07-26 PROCEDURE — 81003 URINALYSIS AUTO W/O SCOPE: CPT | Mod: QW,S$GLB,, | Performed by: NURSE PRACTITIONER

## 2023-07-26 PROCEDURE — 3008F PR BODY MASS INDEX (BMI) DOCUMENTED: ICD-10-PCS | Mod: CPTII,S$GLB,, | Performed by: NURSE PRACTITIONER

## 2023-07-26 PROCEDURE — 1160F RVW MEDS BY RX/DR IN RCRD: CPT | Mod: CPTII,S$GLB,, | Performed by: NURSE PRACTITIONER

## 2023-07-26 PROCEDURE — 99214 PR OFFICE/OUTPT VISIT, EST, LEVL IV, 30-39 MIN: ICD-10-PCS | Mod: S$GLB,,, | Performed by: NURSE PRACTITIONER

## 2023-07-26 PROCEDURE — 1160F PR REVIEW ALL MEDS BY PRESCRIBER/CLIN PHARMACIST DOCUMENTED: ICD-10-PCS | Mod: CPTII,S$GLB,, | Performed by: NURSE PRACTITIONER

## 2023-07-26 PROCEDURE — 3008F BODY MASS INDEX DOCD: CPT | Mod: CPTII,S$GLB,, | Performed by: NURSE PRACTITIONER

## 2023-07-26 PROCEDURE — 4010F PR ACE/ARB THEARPY RXD/TAKEN: ICD-10-PCS | Mod: CPTII,S$GLB,, | Performed by: NURSE PRACTITIONER

## 2023-07-26 PROCEDURE — 1159F MED LIST DOCD IN RCRD: CPT | Mod: CPTII,S$GLB,, | Performed by: NURSE PRACTITIONER

## 2023-07-26 PROCEDURE — 4010F ACE/ARB THERAPY RXD/TAKEN: CPT | Mod: CPTII,S$GLB,, | Performed by: NURSE PRACTITIONER

## 2023-07-26 PROCEDURE — 99214 OFFICE O/P EST MOD 30 MIN: CPT | Mod: S$GLB,,, | Performed by: NURSE PRACTITIONER

## 2023-07-26 NOTE — PROGRESS NOTES
Subjective:       Patient ID: Johanna Pichardo is a 62 y.o. female.    Chief Complaint: 6 month f/u      HPI: 62-year-old female, established patient, presents for 6 month visit.    Patient has history of frequency and urgency.    Patient currently maintained on dietary and behavioral modifications.    States he is doing well without any significant frequency or urgency.      Patient has history of microscopic hematuria.    She did have a cysto 2020 with no abnormalities.      Patient denies any blood in urine.  Denies any odor urine.  Denies any fever or body aches.    No other urinary complaints at this time.           Past Medical History:   Past Medical History:   Diagnosis Date    Allergy     Anxiety     BMI 23.0-23.9, adult     Carpal tunnel syndrome     left arm    GERD (gastroesophageal reflux disease)     High cholesterol     Hypertension     Irritable bowel syndrome        Past Surgical Historical:   Past Surgical History:   Procedure Laterality Date    APPENDECTOMY      COLONOSCOPY  10/03/2019    1 hyperp, repeat colon in 5y.    CYSTOSCOPY      ENDOSCOPY N/A     OOPHORECTOMY          Medications:   Medication List with Changes/Refills   Current Medications    ALREX 0.2 % DRPS        AMITRIPTYLINE (ELAVIL) 25 MG TABLET        AMLODIPINE (NORVASC) 2.5 MG TABLET        AZELASTINE 205.5 MCG (0.15 %) SPRY        CLOBETASOL (TEMOVATE) 0.05 % CREAM        CLORAZEPATE (TRANXENE) 7.5 MG TAB        DICYCLOMINE (BENTYL) 10 MG/5 ML SYRUP    Take 5 mLs (10 mg total) by mouth 4 (four) times daily before meals and nightly.    FLUOCINONIDE 0.05% (LIDEX) 0.05 % CREAM    Apply 30 g topically as needed.    HYDROXYCHLOROQUINE (PLAQUENIL) 200 MG TABLET        HYDROXYZINE HCL (ATARAX) 25 MG TABLET    Take 25 mg by mouth as needed.    LINACLOTIDE (LINZESS) 145 MCG CAP CAPSULE    Take 1 capsule (145 mcg total) by mouth before breakfast.    LOSARTAN (COZAAR) 25 MG TABLET        LOSARTAN (COZAAR) 50 MG TABLET        METOPROLOL  SUCCINATE (TOPROL-XL) 50 MG 24 HR TABLET        MONTELUKAST (SINGULAIR) 10 MG TABLET        MYCOPHENOLATE (CELLCEPT) 500 MG TAB        NYSTATIN-TRIAMCINOLONE (MYCOLOG II) CREAM        OMEPRAZOLE (PRILOSEC) 40 MG CAPSULE        OXYBUTYNIN (DITROPAN-XL) 10 MG 24 HR TABLET    Take 1 tablet (10 mg total) by mouth once daily.    RABEPRAZOLE (ACIPHEX) 20 MG TABLET    Take 1 tablet (20 mg total) by mouth once daily.    ROSUVASTATIN (CRESTOR) 10 MG TABLET        TERCONAZOLE (TERAZOL 3) 0.8 % VAGINAL CREAM        TINIDAZOLE (TINDAMAX) 500 MG TABLET    Take 500 mg by mouth as needed.    VALACYCLOVIR (VALTREX) 1000 MG TABLET            Past Social History:   Social History     Socioeconomic History    Marital status:    Tobacco Use    Smoking status: Every Day     Packs/day: 0.25     Types: Cigarettes    Smokeless tobacco: Never   Substance and Sexual Activity    Alcohol use: Yes     Alcohol/week: 7.0 standard drinks     Types: 4 Glasses of wine, 3 Shots of liquor per week    Drug use: Never    Sexual activity: Not Currently     Partners: Male       Allergies:   Review of patient's allergies indicates:   Allergen Reactions    Keflex [cephalexin] Rash        Family History:   Family History   Problem Relation Age of Onset    Colon cancer Neg Hx     Stomach cancer Neg Hx     Liver cancer Neg Hx     Liver disease Neg Hx     Pancreatic cancer Neg Hx     Esophageal cancer Neg Hx     Throat cancer Neg Hx     Ulcerative colitis Neg Hx     Crohn's disease Neg Hx         Review of Systems:  Review of Systems   Constitutional:  Negative for activity change and appetite change.   HENT:  Negative for congestion and dental problem.    Respiratory:  Negative for chest tightness and shortness of breath.    Cardiovascular:  Negative for chest pain.   Gastrointestinal:  Negative for abdominal distention and abdominal pain.   Genitourinary:  Negative for decreased urine volume, difficulty urinating, dyspareunia, dysuria, enuresis, flank  pain, frequency, genital sores, hematuria, pelvic pain and urgency.   Musculoskeletal:  Negative for back pain and neck pain.   Allergic/Immunologic: Negative for immunocompromised state.   Neurological:  Negative for dizziness.   Hematological:  Negative for adenopathy.   Psychiatric/Behavioral:  Negative for agitation, behavioral problems and confusion.      Physical Exam:  Physical Exam  Vitals and nursing note reviewed.   Constitutional:       Appearance: She is well-developed.   HENT:      Head: Normocephalic.   Eyes:      Pupils: Pupils are equal, round, and reactive to light.   Cardiovascular:      Rate and Rhythm: Normal rate and regular rhythm.      Heart sounds: Normal heart sounds.   Pulmonary:      Effort: Pulmonary effort is normal.      Breath sounds: Normal breath sounds.   Abdominal:      General: Bowel sounds are normal.      Palpations: Abdomen is soft.   Musculoskeletal:         General: Normal range of motion.      Cervical back: Normal range of motion and neck supple.   Skin:     General: Skin is warm and dry.   Neurological:      Mental Status: She is alert and oriented to person, place, and time.   Psychiatric:         Mood and Affect: Mood normal.         Behavior: Behavior normal.     Urinalysis: Small blood, red blood cells 0-3.    Assessment/Plan:   1. Frequency/urgency: Patient continues to do well with dietary and behavioral modifications.      2. Microscopic hematuria:  Patient had a cysto in 2020.    No record of any imaging.  Will set patient up for renal ultrasound.      Will plan follow-up in 6 months, sooner if needed.    Problem List Items Addressed This Visit    None  Visit Diagnoses       Urinary frequency    -  Primary    Relevant Orders    POCT Urinalysis(Instrument)    Urinary urgency        Relevant Orders    POCT Urinalysis(Instrument)    Hematuria, microscopic        Relevant Orders    POCT Urinalysis(Instrument)    US Retroperitoneal Complete

## 2023-08-02 ENCOUNTER — TELEPHONE (OUTPATIENT)
Dept: UROLOGY | Facility: CLINIC | Age: 63
End: 2023-08-02
Payer: COMMERCIAL

## 2023-08-02 NOTE — TELEPHONE ENCOUNTER
----- Message from Lolly Tello sent at 8/2/2023 12:23 PM CDT -----  Patient calling to have orders sent for CT scan per Dr. Ananda Ritter..please call patient back at [phone].            Thanks  [facility]

## 2023-08-02 NOTE — TELEPHONE ENCOUNTER
Pt notified that order has been sent to central. Informed her to try calling in the morning to give time to receive her order.

## 2023-08-11 ENCOUNTER — TELEPHONE (OUTPATIENT)
Dept: UROLOGY | Facility: CLINIC | Age: 63
End: 2023-08-11
Payer: COMMERCIAL

## 2023-08-11 NOTE — TELEPHONE ENCOUNTER
----- Message from Hiwot Angeles sent at 8/11/2023 10:39 AM CDT -----  Contact: self  Type:  Patient Returning Call    Who Called:Johanna Pichardo  Who Left Message for Patient:arias  Does the patient know what this is regarding?:ultrasound  Would the patient rather a call back or a response via [facility]? Call back  Best Call Back Number:[phone]  Additional Information: n/a

## 2023-11-25 ENCOUNTER — TELEPHONE (OUTPATIENT)
Dept: GASTROENTEROLOGY | Facility: CLINIC | Age: 63
End: 2023-11-25
Payer: COMMERCIAL

## 2023-11-26 NOTE — TELEPHONE ENCOUNTER
Notify patient that I received a notice from her Rx plan regarding her getting omeprazole 40 mg daily from Dr. Levy and rabeprazole 20 mg daily from me. She does not need to take both of these medicines since they work in the same fashion. Keep her follow up OV with me and notify me sooner if any issues.  FLAKITA

## 2024-01-11 ENCOUNTER — OFFICE VISIT (OUTPATIENT)
Dept: GASTROENTEROLOGY | Facility: CLINIC | Age: 64
End: 2024-01-11
Payer: COMMERCIAL

## 2024-01-11 VITALS
OXYGEN SATURATION: 97 % | SYSTOLIC BLOOD PRESSURE: 174 MMHG | HEART RATE: 77 BPM | DIASTOLIC BLOOD PRESSURE: 82 MMHG | WEIGHT: 152 LBS | BODY MASS INDEX: 24.43 KG/M2 | HEIGHT: 66 IN

## 2024-01-11 DIAGNOSIS — Z86.010 PERSONAL HISTORY OF COLONIC POLYPS: ICD-10-CM

## 2024-01-11 DIAGNOSIS — K58.1 IRRITABLE BOWEL SYNDROME WITH CONSTIPATION: Primary | ICD-10-CM

## 2024-01-11 DIAGNOSIS — K21.9 GASTROESOPHAGEAL REFLUX DISEASE, UNSPECIFIED WHETHER ESOPHAGITIS PRESENT: ICD-10-CM

## 2024-01-11 DIAGNOSIS — K59.04 CHRONIC IDIOPATHIC CONSTIPATION: ICD-10-CM

## 2024-01-11 DIAGNOSIS — Z12.11 SCREENING FOR COLON CANCER: ICD-10-CM

## 2024-01-11 PROCEDURE — 3079F DIAST BP 80-89 MM HG: CPT | Mod: CPTII,S$GLB,, | Performed by: INTERNAL MEDICINE

## 2024-01-11 PROCEDURE — 3077F SYST BP >= 140 MM HG: CPT | Mod: CPTII,S$GLB,, | Performed by: INTERNAL MEDICINE

## 2024-01-11 PROCEDURE — 1159F MED LIST DOCD IN RCRD: CPT | Mod: CPTII,S$GLB,, | Performed by: INTERNAL MEDICINE

## 2024-01-11 PROCEDURE — 3008F BODY MASS INDEX DOCD: CPT | Mod: CPTII,S$GLB,, | Performed by: INTERNAL MEDICINE

## 2024-01-11 PROCEDURE — 1160F RVW MEDS BY RX/DR IN RCRD: CPT | Mod: CPTII,S$GLB,, | Performed by: INTERNAL MEDICINE

## 2024-01-11 PROCEDURE — 99214 OFFICE O/P EST MOD 30 MIN: CPT | Mod: S$GLB,,, | Performed by: INTERNAL MEDICINE

## 2024-01-11 RX ORDER — SOD SULF/POT CHLORIDE/MAG SULF 1.479 G
TABLET ORAL
Qty: 24 TABLET | Refills: 0 | Status: SHIPPED | OUTPATIENT
Start: 2024-01-11

## 2024-01-11 RX ORDER — RABEPRAZOLE SODIUM 20 MG/1
20 TABLET, DELAYED RELEASE ORAL DAILY
Qty: 90 TABLET | Refills: 4 | Status: SHIPPED | OUTPATIENT
Start: 2024-01-11 | End: 2024-01-17 | Stop reason: SDUPTHER

## 2024-01-11 RX ORDER — FLUTICASONE PROPIONATE 50 MCG
SPRAY, SUSPENSION (ML) NASAL
COMMUNITY
Start: 2023-11-01

## 2024-01-11 NOTE — PATIENT INSTRUCTIONS
Schedule colonoscopy.  Continue Acidphex.   Take Linzess 145 mcg daily.     Please notify my office if you have not been contacted within two weeks after any procedures, submitting any samples (biopsies, blood, stool, urine, etc.) or after any imaging (X-ray, CT, MRI, etc.).

## 2024-01-11 NOTE — LETTER
January 11, 2024        Matt Levy MD  [facility] [address] [id number]             [place] [facility]  [address] [address]  Phone: [phone]  Fax: [phone]   Patient: Johanna Pichardo   MR Number: [id number]   YOB: 1960   Date of Visit: 1/11/2024       Dear Dr. Levy:    Thank you for referring Johanna Pichardo to me for evaluation. Attached you will find relevant portions of my assessment and plan of care.    If you have questions, please do not hesitate to call me. I look forward to following Johanna Pichardo along with you.    Sincerely,      Mikayla Hicks MD            CC  No Recipients    Enclosure

## 2024-01-11 NOTE — PROGRESS NOTES
Clinic Note    Reason for visit:  The primary encounter diagnosis was Irritable bowel syndrome with constipation. Diagnoses of Gastroesophageal reflux disease, unspecified whether esophagitis present, Personal history of colonic polyps, Chronic idiopathic constipation, and Screening for colon cancer were also pertinent to this visit.    PCP: Matt Levy       HPI:  This is a 63 y.o. female here for follow up. Patient with GERD, IBS-C. On Aciphex 20mg daily and this works well. She still has occasional abdominal pain and takes dicyclomine as needed but it makes her sleepy.   She started taking Allegra and her rash resolved.     Her  passed away in 8/2022 and she has had a lot of stress lately.     7/3/2023 [place] FIT neg.   4/20/2022: GES nl  2021 CT panc: fatty liver, panc nl  EGD 5/18/2022 with liquids the day prior: normal  EGD 4/4/2022: DBx nl, GBx react w/o Hp, retained food in stomach.  Colonoscopy 10/3/2019: Signs of extrinsic adhesions. 1 hyperp, repeat colon in 5y    Review of Systems   Constitutional:  Negative for fatigue, fever and unexpected weight change.   HENT:  Negative for mouth sores, postnasal drip, sore throat and trouble swallowing.    Eyes:  Negative for pain, discharge and eye dryness.   Respiratory:  Negative for apnea, cough, choking, chest tightness, shortness of breath and wheezing.    Cardiovascular:  Negative for chest pain, palpitations and leg swelling.   Gastrointestinal:  Negative for abdominal distention, abdominal pain, anal bleeding, blood in stool, change in bowel habit, constipation, diarrhea, nausea, rectal pain, vomiting, reflux and fecal incontinence.   Genitourinary:  Negative for bladder incontinence, dysuria and hematuria.   Musculoskeletal:  Negative for arthralgias, back pain and joint swelling.   Integumentary:  Negative for color change and rash.   Allergic/Immunologic: Negative for environmental allergies and food allergies.   Neurological:  Negative  for seizures and headaches.   Hematological:  Negative for adenopathy. Does not bruise/bleed easily.        Past Medical History:   Diagnosis Date    Allergy     Anxiety     BMI 23.0-23.9, adult     Carpal tunnel syndrome     left arm    GERD (gastroesophageal reflux disease)     High cholesterol     Hypertension     Irritable bowel syndrome      Past Surgical History:   Procedure Laterality Date    APPENDECTOMY      COLONOSCOPY  10/03/2019    1 hyperp, repeat colon in 5y.    CYSTOSCOPY      ENDOSCOPY N/A     OOPHORECTOMY       Family History   Problem Relation Age of Onset    Colon cancer Neg Hx     Stomach cancer Neg Hx     Liver cancer Neg Hx     Liver disease Neg Hx     Pancreatic cancer Neg Hx     Esophageal cancer Neg Hx     Throat cancer Neg Hx     Ulcerative colitis Neg Hx     Crohn's disease Neg Hx      Social History     Tobacco Use    Smoking status: Every Day     Current packs/day: 0.25     Types: Cigarettes    Smokeless tobacco: Never   Substance Use Topics    Alcohol use: Yes     Alcohol/week: 7.0 standard drinks of alcohol     Types: 4 Glasses of wine, 3 Shots of liquor per week    Drug use: Never     Review of patient's allergies indicates:   Allergen Reactions    Azithromycin Rash    Keflex [cephalexin] Rash        Medication List with Changes/Refills   New Medications    LINACLOTIDE (LINZESS) 145 MCG CAP CAPSULE    Take 1 capsule (145 mcg total) by mouth before breakfast.    SOD SULF-POT CHLORIDE-MAG SULF (SUTAB) 1.479-0.188- 0.225 GRAM TABLET    Take according to package instructions with indicated amount of water. No breakfast day before test. May substitute with Suprep, Clenpiq, Plenvu, Moviprep or GoLytely based on Rx plan and patient preference.   Current Medications    ALREX 0.2 % DRPS        CLOBETASOL (TEMOVATE) 0.05 % CREAM        CLORAZEPATE (TRANXENE) 7.5 MG TAB        DICYCLOMINE (BENTYL) 10 MG/5 ML SYRUP    Take 5 mLs (10 mg total) by mouth 4 (four) times daily before meals and  "nightly.    FLUOCINONIDE 0.05% (LIDEX) 0.05 % CREAM    Apply 30 g topically as needed.    FLUTICASONE PROPIONATE (FLONASE) 50 MCG/ACTUATION NASAL SPRAY        HYDROXYZINE HCL (ATARAX) 25 MG TABLET    Take 25 mg by mouth as needed.    LOSARTAN (COZAAR) 25 MG TABLET        LOSARTAN (COZAAR) 50 MG TABLET        NYSTATIN-TRIAMCINOLONE (MYCOLOG II) CREAM        OXYBUTYNIN (DITROPAN-XL) 10 MG 24 HR TABLET    Take 1 tablet (10 mg total) by mouth once daily.    ROSUVASTATIN (CRESTOR) 10 MG TABLET        TERCONAZOLE (TERAZOL 3) 0.8 % VAGINAL CREAM        TINIDAZOLE (TINDAMAX) 500 MG TABLET    Take 500 mg by mouth as needed.    VALACYCLOVIR (VALTREX) 1000 MG TABLET       Changed and/or Refilled Medications    Modified Medication Previous Medication    RABEPRAZOLE (ACIPHEX) 20 MG TABLET RABEprazole (ACIPHEX) 20 mg tablet       Take 1 tablet (20 mg total) by mouth once daily.    Take 1 tablet (20 mg total) by mouth once daily.   Discontinued Medications    AMITRIPTYLINE (ELAVIL) 25 MG TABLET        AMLODIPINE (NORVASC) 2.5 MG TABLET        AZELASTINE 205.5 MCG (0.15 %) SPRY        HYDROXYCHLOROQUINE (PLAQUENIL) 200 MG TABLET        LINACLOTIDE (LINZESS) 145 MCG CAP CAPSULE    Take 1 capsule (145 mcg total) by mouth before breakfast.    METOPROLOL SUCCINATE (TOPROL-XL) 50 MG 24 HR TABLET        MONTELUKAST (SINGULAIR) 10 MG TABLET        MYCOPHENOLATE (CELLCEPT) 500 MG TAB        OMEPRAZOLE (PRILOSEC) 40 MG CAPSULE             Vital Signs:  BP (!) 174/82   Pulse 77   Ht 5' 6" (1.676 m)   Wt 68.9 kg (152 lb)   SpO2 97%   BMI 24.53 kg/m²         Physical Exam  Vitals reviewed.   Constitutional:       General: She is awake. She is not in acute distress.     Appearance: Normal appearance. She is well-developed. She is not ill-appearing, toxic-appearing or diaphoretic.   HENT:      Head: Normocephalic and atraumatic.      Nose: Nose normal.      Mouth/Throat:      Mouth: Mucous membranes are moist.      Pharynx: Oropharynx is " clear. No oropharyngeal exudate or posterior oropharyngeal erythema.   Eyes:      General: Lids are normal. Gaze aligned appropriately. No scleral icterus.        Right eye: No discharge.         Left eye: No discharge.      Conjunctiva/sclera: Conjunctivae normal.   Neck:      Trachea: Trachea normal.   Cardiovascular:      Rate and Rhythm: Normal rate and regular rhythm.      Pulses:           Radial pulses are 2+ on the right side and 2+ on the left side.   Pulmonary:      Effort: Pulmonary effort is normal. No respiratory distress.      Breath sounds: No stridor. No wheezing.   Chest:      Chest wall: No tenderness.   Abdominal:      General: Bowel sounds are normal. There is no distension.      Palpations: Abdomen is soft. There is no fluid wave, hepatomegaly or mass.      Tenderness: There is no abdominal tenderness. There is no guarding or rebound.   Musculoskeletal:         General: No tenderness or deformity.      Cervical back: Full passive range of motion without pain and neck supple. No tenderness.      Right lower leg: No edema.      Left lower leg: No edema.   Lymphadenopathy:      Cervical: No cervical adenopathy.   Skin:     General: Skin is warm and dry.      Capillary Refill: Capillary refill takes less than 2 seconds.      Coloration: Skin is not cyanotic, jaundiced or pale.   Neurological:      General: No focal deficit present.      Mental Status: She is alert and oriented to person, place, and time.      Motor: No tremor.   Psychiatric:         Attention and Perception: Attention normal.         Mood and Affect: Mood and affect normal.         Speech: Speech normal.         Behavior: Behavior normal. Behavior is cooperative.            All of the data above and below has been reviewed by myself and any further interpretations will be reflected in the assessment and plan.   The data includes review of external notes, and independent interpretation of lab results, procedures, x-rays, and imaging  reports.      Assessment:  Irritable bowel syndrome with constipation    Gastroesophageal reflux disease, unspecified whether esophagitis present  Comments:  Aciphex 20 daily, may try to wean once good BM regimen  Orders:  -     RABEprazole (ACIPHEX) 20 mg tablet; Take 1 tablet (20 mg total) by mouth once daily.  Dispense: 90 tablet; Refill: 4    Personal history of colonic polyps  -     Ambulatory Referral to External Surgery    Chronic idiopathic constipation  -     linaCLOtide (LINZESS) 145 mcg Cap capsule; Take 1 capsule (145 mcg total) by mouth before breakfast.  Dispense: 90 capsule; Refill: 3    Screening for colon cancer  -     Ambulatory Referral to External Surgery  -     sod sulf-pot chloride-mag sulf (SUTAB) 1.479-0.188- 0.225 gram tablet; Take according to package instructions with indicated amount of water. No breakfast day before test. May substitute with Suprep, Clenpiq, Plenvu, Moviprep or GoLytely based on Rx plan and patient preference.  Dispense: 24 tablet; Refill: 0        GERD- on Aciphex.  IBS-C- linzess 145mcg worked well but cost prohibitive. She   Hx colon polyps. Colonoscopy due 10/2024.     Recommendations:  Schedule colonoscopy after 10/3/2024.   Continue Acidphex.   Take Linzess 145 mcg daily.     Risks, benefits, and alternatives of medical management, any associated procedures, and/or treatment discussed with the patient. Patient given opportunity to ask questions and voices understanding. Patient has elected to proceed with the recommended care modalities as discussed.    Instructed patient to notify my office if they have not been contacted within two weeks after any procedures, submitting any samples (biopsies, blood, stool, urine, etc.) or after any imaging (X-ray, CT, MRI, etc.).     Follow up in about 1 year (around 1/11/2025).    Order summary:  Orders Placed This Encounter   Procedures    Ambulatory Referral to External Surgery      This assessment, plan, and documentation was  performed in collaboration with Gisella Lafleur NP.      This document may have been created using a voice recognition transcribing system. Incorrect words or phrases may have been missed during proofreading. Please interpret accordingly or contact me for clarification.     Mikayla Hicks MD

## 2024-01-17 DIAGNOSIS — K21.9 GASTROESOPHAGEAL REFLUX DISEASE, UNSPECIFIED WHETHER ESOPHAGITIS PRESENT: ICD-10-CM

## 2024-01-19 RX ORDER — RABEPRAZOLE SODIUM 20 MG/1
20 TABLET, DELAYED RELEASE ORAL DAILY
Qty: 90 TABLET | Refills: 4 | Status: SHIPPED | OUTPATIENT
Start: 2024-01-19

## 2024-01-23 ENCOUNTER — OFFICE VISIT (OUTPATIENT)
Dept: UROLOGY | Facility: CLINIC | Age: 64
End: 2024-01-23
Payer: COMMERCIAL

## 2024-01-23 VITALS — DIASTOLIC BLOOD PRESSURE: 79 MMHG | SYSTOLIC BLOOD PRESSURE: 130 MMHG

## 2024-01-23 DIAGNOSIS — R39.15 URINARY URGENCY: ICD-10-CM

## 2024-01-23 DIAGNOSIS — R31.29 HEMATURIA, MICROSCOPIC: ICD-10-CM

## 2024-01-23 DIAGNOSIS — R35.0 URINARY FREQUENCY: Primary | ICD-10-CM

## 2024-01-23 LAB
BILIRUBIN, UA POC OHS: NEGATIVE
BLOOD, UA POC OHS: ABNORMAL
CLARITY, UA POC OHS: CLEAR
COLOR, UA POC OHS: YELLOW
GLUCOSE, UA POC OHS: NEGATIVE
KETONES, UA POC OHS: NEGATIVE
LEUKOCYTES, UA POC OHS: NEGATIVE
NITRITE, UA POC OHS: NEGATIVE
PH, UA POC OHS: 7
POC RESIDUAL URINE VOLUME: 0 ML (ref 0–100)
PROTEIN, UA POC OHS: NEGATIVE
SPECIFIC GRAVITY, UA POC OHS: 1.01
UROBILINOGEN, UA POC OHS: 0.2

## 2024-01-23 PROCEDURE — 1160F RVW MEDS BY RX/DR IN RCRD: CPT | Mod: CPTII,S$GLB,, | Performed by: NURSE PRACTITIONER

## 2024-01-23 PROCEDURE — 51798 US URINE CAPACITY MEASURE: CPT | Mod: S$GLB,,, | Performed by: NURSE PRACTITIONER

## 2024-01-23 PROCEDURE — 3078F DIAST BP <80 MM HG: CPT | Mod: CPTII,S$GLB,, | Performed by: NURSE PRACTITIONER

## 2024-01-23 PROCEDURE — 81003 URINALYSIS AUTO W/O SCOPE: CPT | Mod: QW,S$GLB,, | Performed by: NURSE PRACTITIONER

## 2024-01-23 PROCEDURE — 1159F MED LIST DOCD IN RCRD: CPT | Mod: CPTII,S$GLB,, | Performed by: NURSE PRACTITIONER

## 2024-01-23 PROCEDURE — 99214 OFFICE O/P EST MOD 30 MIN: CPT | Mod: S$GLB,,, | Performed by: NURSE PRACTITIONER

## 2024-01-23 PROCEDURE — 3075F SYST BP GE 130 - 139MM HG: CPT | Mod: CPTII,S$GLB,, | Performed by: NURSE PRACTITIONER

## 2024-01-23 NOTE — PROGRESS NOTES
Subjective:       Patient ID: Johanna Pichardo is a 63 y.o. female.    Chief Complaint: Urinary Frequency      HPI: 63-year-old female, established patient, presents for 6 month visit.    Patient has a history of urinary frequency and urgency.  He is currently maintained on dietary and behavioral modifications.    She does have oxybutynin XL 10 mg on hand.  However, she has never used it.      She also has history of microscopic hematuria.  She had a cysto couple years ago.  We did an ultrasound 6 months ago.  Both were negative.    She denies seeing any blood in the urine.  Denies any unexpected weight loss.  She denies any pain or burning with urination.  Denies any fever or body aches.  Denies any odor to the urine.    No other urinary complaints at this time.       Past Medical History:   Past Medical History:   Diagnosis Date    Allergy     Anxiety     BMI 23.0-23.9, adult     Carpal tunnel syndrome     left arm    GERD (gastroesophageal reflux disease)     High cholesterol     Hypertension     Irritable bowel syndrome        Past Surgical Historical:   Past Surgical History:   Procedure Laterality Date    APPENDECTOMY      COLONOSCOPY  10/03/2019    1 hyperp, repeat colon in 5y.    CYSTOSCOPY      ENDOSCOPY N/A     OOPHORECTOMY          Medications:   Medication List with Changes/Refills   Current Medications    ALREX 0.2 % DRPS        CLOBETASOL (TEMOVATE) 0.05 % CREAM        CLORAZEPATE (TRANXENE) 7.5 MG TAB        DICYCLOMINE (BENTYL) 10 MG/5 ML SYRUP    Take 5 mLs (10 mg total) by mouth 4 (four) times daily before meals and nightly.    FLUOCINONIDE 0.05% (LIDEX) 0.05 % CREAM    Apply 30 g topically as needed.    FLUTICASONE PROPIONATE (FLONASE) 50 MCG/ACTUATION NASAL SPRAY        HYDROXYZINE HCL (ATARAX) 25 MG TABLET    Take 25 mg by mouth as needed.    LINACLOTIDE (LINZESS) 145 MCG CAP CAPSULE    Take 1 capsule (145 mcg total) by mouth before breakfast.    LOSARTAN (COZAAR) 25 MG TABLET         LOSARTAN (COZAAR) 50 MG TABLET        NYSTATIN-TRIAMCINOLONE (MYCOLOG II) CREAM        OXYBUTYNIN (DITROPAN-XL) 10 MG 24 HR TABLET    Take 1 tablet (10 mg total) by mouth once daily.    RABEPRAZOLE (ACIPHEX) 20 MG TABLET    Take 1 tablet (20 mg total) by mouth once daily.    ROSUVASTATIN (CRESTOR) 10 MG TABLET        SOD SULF-POT CHLORIDE-MAG SULF (SUTAB) 1.479-0.188- 0.225 GRAM TABLET    Take according to package instructions with indicated amount of water. No breakfast day before test. May substitute with Suprep, Clenpiq, Plenvu, Moviprep or GoLytely based on Rx plan and patient preference.    TERCONAZOLE (TERAZOL 3) 0.8 % VAGINAL CREAM        TINIDAZOLE (TINDAMAX) 500 MG TABLET    Take 500 mg by mouth as needed.    VALACYCLOVIR (VALTREX) 1000 MG TABLET            Past Social History:   Social History     Socioeconomic History    Marital status:    Tobacco Use    Smoking status: Every Day     Current packs/day: 0.25     Types: Cigarettes    Smokeless tobacco: Never   Substance and Sexual Activity    Alcohol use: Yes     Alcohol/week: 7.0 standard drinks of alcohol     Types: 4 Glasses of wine, 3 Shots of liquor per week    Drug use: Never    Sexual activity: Not Currently     Partners: Male       Allergies:   Review of patient's allergies indicates:   Allergen Reactions    Azithromycin Rash    Keflex [cephalexin] Rash        Family History:   Family History   Problem Relation Age of Onset    Colon cancer Neg Hx     Stomach cancer Neg Hx     Liver cancer Neg Hx     Liver disease Neg Hx     Pancreatic cancer Neg Hx     Esophageal cancer Neg Hx     Throat cancer Neg Hx     Ulcerative colitis Neg Hx     Crohn's disease Neg Hx         Review of Systems:  Review of Systems   Constitutional:  Negative for activity change and appetite change.   HENT:  Negative for congestion and dental problem.    Respiratory:  Negative for chest tightness and shortness of breath.    Cardiovascular:  Negative for  chest pain.   Gastrointestinal:  Negative for abdominal distention and abdominal pain.   Genitourinary:  Negative for decreased urine volume, difficulty urinating, dyspareunia, dysuria, enuresis, flank pain, frequency, genital sores, hematuria, pelvic pain and urgency.   Musculoskeletal:  Negative for back pain and neck pain.   Allergic/Immunologic: Negative for immunocompromised state.   Neurological:  Negative for dizziness.   Hematological:  Negative for adenopathy.   Psychiatric/Behavioral:  Negative for agitation, behavioral problems and confusion.        Physical Exam:  Physical Exam  Vitals and nursing note reviewed.   Constitutional:       Appearance: She is well-developed.   HENT:      Head: Normocephalic.   Eyes:      Pupils: Pupils are equal, round, and reactive to light.   Cardiovascular:      Rate and Rhythm: Normal rate and regular rhythm.      Heart sounds: Normal heart sounds.   Pulmonary:      Effort: Pulmonary effort is normal.      Breath sounds: Normal breath sounds.   Abdominal:      General: Bowel sounds are normal.      Palpations: Abdomen is soft.   Musculoskeletal:         General: Normal range of motion.      Cervical back: Normal range of motion and neck supple.   Skin:     General: Skin is warm and dry.   Neurological:      Mental Status: She is alert and oriented to person, place, and time.   Psychiatric:         Mood and Affect: Mood normal.         Behavior: Behavior normal.     Urinalysis: Trace intact blood, red blood cells 0-3.    Bladder scan:  0 cc    Assessment/Plan:   1. Frequency/urgency: Patient does pretty well with dietary behavior modifications.    She does have oxybutynin on hand.  States she may try it.  She will notify us if she does and the results.      2. Microscopic hematuria:  Patient has had a negative workup.    No gross blood visible in urine.  Trace amount noted today.    Will plan follow-up in 6 months, sooner if needed.  Problem List Items Addressed This Visit     None  Visit Diagnoses       Urinary frequency    -  Primary    Relevant Orders    POCT Urinalysis(Instrument) (Completed)    POCT Bladder Scan    Urinary urgency        Relevant Orders    POCT Urinalysis(Instrument) (Completed)    POCT Bladder Scan    Hematuria, microscopic        Relevant Orders    POCT Urinalysis(Instrument) (Completed)

## 2024-02-19 DIAGNOSIS — K58.1 IRRITABLE BOWEL SYNDROME WITH CONSTIPATION: ICD-10-CM

## 2024-02-20 RX ORDER — DICYCLOMINE HYDROCHLORIDE 10 MG/5ML
10 SOLUTION ORAL
Qty: 473 ML | Refills: 3 | Status: SHIPPED | OUTPATIENT
Start: 2024-02-20

## 2024-09-20 ENCOUNTER — TELEPHONE (OUTPATIENT)
Dept: GASTROENTEROLOGY | Facility: CLINIC | Age: 64
End: 2024-09-20
Payer: COMMERCIAL

## 2024-09-20 NOTE — TELEPHONE ENCOUNTER
----- Message from Amber Alcantara sent at 9/19/2024  4:36 PM CDT -----  Contact: self  Patient is requesting a call back regarding upcoming procedure. Please call back at [phone]

## 2024-09-20 NOTE — TELEPHONE ENCOUNTER
----- Message from Meghna Mckeon sent at 9/20/2024  9:57 AM CDT -----  Contact: pt  Type:  Patient Returning Call    Who Called:pt  Who Left Message for Patient:char    Does the patient know what this is regarding?:missed call  Would the patient rather a call back or a response via [facility]? Call back  Best Call Back Number:  [phone]    Additional Information: none  
Called and spoke with patient went over everything , also told her that she could pre register and they would call her the day before her procedure and give her an arrival time and answer any other questions or concerns that she had .  myla  
Previously Declined (within the last year)

## 2024-10-03 ENCOUNTER — TELEPHONE (OUTPATIENT)
Dept: GASTROENTEROLOGY | Facility: CLINIC | Age: 64
End: 2024-10-03
Payer: COMMERCIAL

## 2024-10-03 NOTE — TELEPHONE ENCOUNTER
----- Message from Amber sent at 10/2/2024  3:54 PM CDT -----  Contact: liza perry  Type: Staff Message     Who called: liza perry  Call back number: [phone]  Reason for the call: orders  Additional information: n/a

## 2024-10-07 ENCOUNTER — OUTSIDE PLACE OF SERVICE (OUTPATIENT)
Dept: GASTROENTEROLOGY | Facility: CLINIC | Age: 64
End: 2024-10-07

## 2024-10-07 LAB — CRC RECOMMENDATION EXT: NORMAL

## 2024-10-07 PROCEDURE — G0105 COLORECTAL SCRN; HI RISK IND: HCPCS | Mod: ,,, | Performed by: INTERNAL MEDICINE

## 2024-11-01 ENCOUNTER — TELEPHONE (OUTPATIENT)
Dept: GASTROENTEROLOGY | Facility: CLINIC | Age: 64
End: 2024-11-01
Payer: COMMERCIAL

## 2024-11-20 ENCOUNTER — DOCUMENTATION ONLY (OUTPATIENT)
Dept: GASTROENTEROLOGY | Facility: CLINIC | Age: 64
End: 2024-11-20
Payer: COMMERCIAL

## 2025-01-14 ENCOUNTER — OFFICE VISIT (OUTPATIENT)
Dept: UROLOGY | Facility: CLINIC | Age: 65
End: 2025-01-14
Payer: COMMERCIAL

## 2025-01-14 ENCOUNTER — OFFICE VISIT (OUTPATIENT)
Dept: GASTROENTEROLOGY | Facility: CLINIC | Age: 65
End: 2025-01-14
Payer: COMMERCIAL

## 2025-01-14 VITALS
BODY MASS INDEX: 24.11 KG/M2 | DIASTOLIC BLOOD PRESSURE: 76 MMHG | SYSTOLIC BLOOD PRESSURE: 143 MMHG | WEIGHT: 150 LBS | HEIGHT: 66 IN | HEART RATE: 60 BPM | OXYGEN SATURATION: 98 %

## 2025-01-14 VITALS
BODY MASS INDEX: 23.46 KG/M2 | RESPIRATION RATE: 18 BRPM | SYSTOLIC BLOOD PRESSURE: 132 MMHG | HEART RATE: 57 BPM | DIASTOLIC BLOOD PRESSURE: 78 MMHG | WEIGHT: 146 LBS | HEIGHT: 66 IN | OXYGEN SATURATION: 99 %

## 2025-01-14 DIAGNOSIS — K21.9 GASTROESOPHAGEAL REFLUX DISEASE, UNSPECIFIED WHETHER ESOPHAGITIS PRESENT: ICD-10-CM

## 2025-01-14 DIAGNOSIS — R35.0 URINARY FREQUENCY: Primary | ICD-10-CM

## 2025-01-14 DIAGNOSIS — R31.29 HEMATURIA, MICROSCOPIC: ICD-10-CM

## 2025-01-14 DIAGNOSIS — Z86.0100 HISTORY OF COLON POLYPS: ICD-10-CM

## 2025-01-14 DIAGNOSIS — R33.9 INCOMPLETE BLADDER EMPTYING: ICD-10-CM

## 2025-01-14 DIAGNOSIS — K58.1 IRRITABLE BOWEL SYNDROME WITH CONSTIPATION: Primary | ICD-10-CM

## 2025-01-14 DIAGNOSIS — R39.15 URINARY URGENCY: ICD-10-CM

## 2025-01-14 DIAGNOSIS — K59.04 CHRONIC IDIOPATHIC CONSTIPATION: ICD-10-CM

## 2025-01-14 LAB
BILIRUBIN, UA POC OHS: NEGATIVE
BLOOD, UA POC OHS: ABNORMAL
CLARITY, UA POC OHS: CLEAR
COLOR, UA POC OHS: YELLOW
GLUCOSE, UA POC OHS: NEGATIVE
KETONES, UA POC OHS: NEGATIVE
LEUKOCYTES, UA POC OHS: NEGATIVE
NITRITE, UA POC OHS: NEGATIVE
PH, UA POC OHS: 5.5
POC RESIDUAL URINE VOLUME: 134 ML (ref 0–100)
PROTEIN, UA POC OHS: NEGATIVE
SPECIFIC GRAVITY, UA POC OHS: <=1.005
UROBILINOGEN, UA POC OHS: 0.2

## 2025-01-14 PROCEDURE — 3075F SYST BP GE 130 - 139MM HG: CPT | Mod: CPTII,,, | Performed by: NURSE PRACTITIONER

## 2025-01-14 PROCEDURE — 3078F DIAST BP <80 MM HG: CPT | Mod: CPTII,,, | Performed by: NURSE PRACTITIONER

## 2025-01-14 PROCEDURE — 3008F BODY MASS INDEX DOCD: CPT | Mod: CPTII,,, | Performed by: NURSE PRACTITIONER

## 2025-01-14 PROCEDURE — 3078F DIAST BP <80 MM HG: CPT | Mod: CPTII,,, | Performed by: INTERNAL MEDICINE

## 2025-01-14 PROCEDURE — 99213 OFFICE O/P EST LOW 20 MIN: CPT | Mod: S$PBB,,, | Performed by: INTERNAL MEDICINE

## 2025-01-14 PROCEDURE — 3077F SYST BP >= 140 MM HG: CPT | Mod: CPTII,,, | Performed by: INTERNAL MEDICINE

## 2025-01-14 PROCEDURE — 1160F RVW MEDS BY RX/DR IN RCRD: CPT | Mod: CPTII,,, | Performed by: NURSE PRACTITIONER

## 2025-01-14 PROCEDURE — 1160F RVW MEDS BY RX/DR IN RCRD: CPT | Mod: CPTII,,, | Performed by: INTERNAL MEDICINE

## 2025-01-14 PROCEDURE — 3008F BODY MASS INDEX DOCD: CPT | Mod: CPTII,,, | Performed by: INTERNAL MEDICINE

## 2025-01-14 PROCEDURE — 1159F MED LIST DOCD IN RCRD: CPT | Mod: CPTII,,, | Performed by: NURSE PRACTITIONER

## 2025-01-14 PROCEDURE — 99213 OFFICE O/P EST LOW 20 MIN: CPT | Mod: S$PBB,,, | Performed by: NURSE PRACTITIONER

## 2025-01-14 PROCEDURE — 1159F MED LIST DOCD IN RCRD: CPT | Mod: CPTII,,, | Performed by: INTERNAL MEDICINE

## 2025-01-14 RX ORDER — GABAPENTIN 300 MG/1
300 CAPSULE ORAL
COMMUNITY
Start: 2025-01-12 | End: 2025-01-14

## 2025-01-14 RX ORDER — EPINEPHRINE 0.3 MG/.3ML
0.3 INJECTION SUBCUTANEOUS ONCE
COMMUNITY
Start: 2024-11-01 | End: 2025-01-14

## 2025-01-14 RX ORDER — RABEPRAZOLE SODIUM 20 MG/1
20 TABLET, DELAYED RELEASE ORAL DAILY
Qty: 90 TABLET | Refills: 4 | Status: SHIPPED | OUTPATIENT
Start: 2025-01-14

## 2025-01-14 RX ORDER — ONDANSETRON 4 MG/1
TABLET, ORALLY DISINTEGRATING ORAL
COMMUNITY
Start: 2024-10-31

## 2025-01-14 NOTE — PROGRESS NOTES
Clinic Note    Reason for visit:  The primary encounter diagnosis was Irritable bowel syndrome with constipation. Diagnoses of Gastroesophageal reflux disease, unspecified whether esophagitis present, History of colon polyps, Gastroesophageal reflux disease, unspecified whether esophagitis present, and Chronic idiopathic constipation were also pertinent to this visit.    PCP: Matt Levy       HPI:  This is a 64 y.o. female who is here for a follow up. Patient with GERD and IBS-C. She takes Aciphex 20 mg daily. Was taking Bentyl as needed for abd cramps. Taking Linzess 145 mcg daily for constipation? Went to ER in 10/2024 with abd pain and told had gallstones. Had cholecystectomy with Dr. Ronquillo in 11/2024. She felt improved initially after sarita. She did have a fall and a muscle strain after sarita. Now having BM every day. Taking fiber supplement. Still with intermittent RUQ pain. Improved with belching or passing gas. She missed her dose of Aciphex this morning and has not noticed a difference in symptoms. Previously took dicyclomine for abdominal pain.    Linzess helped but had worsening fecal urgency. Now is having BM twice daily.    10/22/2024 CBC,CMP-nl    CT AP WO/W 12/23/2024: No GB. Scattered colonic diverticulosis w/o infl. Small fat-containing R inguinal hernia. Otherwise nl.  Reviewed: mild stool buildup on Rcolon    Colonoscopy 10/7/2024: Minimal sigmoid diverticulosis, IH. Normal TI/colonic mucosa otherwise. Repeat colon in 5 yr.     7/3/2023 [place] FIT neg.   4/20/2022: GES nl  2021 CT panc: fatty liver, panc nl  EGD 5/18/2022 with liquids the day prior: normal  EGD 4/4/2022: DBx nl, GBx react w/o Hp, retained food in stomach.  Colonoscopy 10/3/2019: Signs of extrinsic adhesions. 1 hyperp, repeat colon in 5y    Review of Systems   Constitutional:  Negative for fatigue, fever and unexpected weight change.   HENT:  Positive for postnasal drip. Negative for mouth sores, sore throat and trouble  swallowing.    Eyes:  Negative for pain, discharge and eye dryness.   Respiratory:  Negative for apnea, cough, choking, chest tightness, shortness of breath and wheezing.    Cardiovascular:  Negative for chest pain, palpitations and leg swelling.   Gastrointestinal:  Positive for abdominal pain and anal bleeding. Negative for abdominal distention, blood in stool, change in bowel habit, constipation, diarrhea, nausea, rectal pain, vomiting, reflux and fecal incontinence.   Genitourinary:  Negative for bladder incontinence, dysuria and hematuria.   Musculoskeletal:  Negative for arthralgias, back pain and joint swelling.   Integumentary:  Negative for color change and rash.   Allergic/Immunologic: Negative for environmental allergies and food allergies.   Neurological:  Negative for seizures and headaches.   Hematological:  Negative for adenopathy. Does not bruise/bleed easily.        Past Medical History:   Diagnosis Date    Allergy     Anxiety     BMI 23.0-23.9, adult     Carpal tunnel syndrome     left arm    GERD (gastroesophageal reflux disease)     High cholesterol     Hypertension     Irritable bowel syndrome      Past Surgical History:   Procedure Laterality Date    APPENDECTOMY      CHOLECYSTECTOMY  11/2024    [facility]    COLONOSCOPY  10/03/2019    1 hyperp, repeat colon in 5y.    CYSTOSCOPY      ENDOSCOPY N/A     OOPHORECTOMY       Family History   Problem Relation Name Age of Onset    Colon cancer Neg Hx      Stomach cancer Neg Hx      Liver cancer Neg Hx      Liver disease Neg Hx      Pancreatic cancer Neg Hx      Esophageal cancer Neg Hx      Throat cancer Neg Hx      Ulcerative colitis Neg Hx      Crohn's disease Neg Hx       Social History     Tobacco Use    Smoking status: Every Day     Current packs/day: 0.25     Types: Cigarettes    Smokeless tobacco: Never   Substance Use Topics    Alcohol use: Yes     Alcohol/week: 7.0 standard drinks of alcohol     Types: 4 Glasses of wine, 3 Shots of liquor per week     Drug use: Never     Review of patient's allergies indicates:   Allergen Reactions    Azithromycin Rash    Keflex [cephalexin] Rash        Medication List with Changes/Refills   Current Medications    ALREX 0.2 % DRPS        CLOBETASOL (TEMOVATE) 0.05 % CREAM        CLORAZEPATE (TRANXENE) 7.5 MG TAB        FLUOCINONIDE 0.05% (LIDEX) 0.05 % CREAM    Apply 30 g topically as needed.    FLUTICASONE PROPIONATE (FLONASE) 50 MCG/ACTUATION NASAL SPRAY        LOSARTAN (COZAAR) 25 MG TABLET        LOSARTAN (COZAAR) 50 MG TABLET        NYSTATIN-TRIAMCINOLONE (MYCOLOG II) CREAM        ONDANSETRON (ZOFRAN-ODT) 4 MG TBDL    1 tab(s) orally 3 times a day PRN NAUSEA for 7 days    ROSUVASTATIN (CRESTOR) 10 MG TABLET        TERCONAZOLE (TERAZOL 3) 0.8 % VAGINAL CREAM        TINIDAZOLE (TINDAMAX) 500 MG TABLET    Take 500 mg by mouth as needed.    VALACYCLOVIR (VALTREX) 1000 MG TABLET       Changed and/or Refilled Medications    Modified Medication Previous Medication    RABEPRAZOLE (ACIPHEX) 20 MG TABLET RABEprazole (ACIPHEX) 20 mg tablet       Take 1 tablet (20 mg total) by mouth once daily.    Take 1 tablet (20 mg total) by mouth once daily.   Discontinued Medications    DICYCLOMINE (BENTYL) 10 MG/5 ML SYRUP    Take 5 mLs (10 mg total) by mouth 4 (four) times daily before meals and nightly.    EPINEPHRINE (EPIPEN) 0.3 MG/0.3 ML ATIN    Inject 0.3 mg into the muscle once.    GABAPENTIN (NEURONTIN) 300 MG CAPSULE    Take 300 mg by mouth.    HYDROXYZINE HCL (ATARAX) 25 MG TABLET    Take 25 mg by mouth as needed.    LINACLOTIDE (LINZESS) 145 MCG CAP CAPSULE    Take 1 capsule (145 mcg total) by mouth before breakfast.    SOD SULF-POT CHLORIDE-MAG SULF (SUTAB) 1.479-0.188- 0.225 GRAM TABLET    Take according to package instructions with indicated amount of water. No breakfast day before test. May substitute with Suprep, Clenpiq, Plenvu, Moviprep or GoLytely based on Rx plan and patient preference.         Vital Signs:  BP (!) 143/76  "(BP Location: Left arm, Patient Position: Sitting)   Pulse 60   Ht 5' 6" (1.676 m)   Wt 68 kg (150 lb)   SpO2 98%   BMI 24.21 kg/m²         Physical Exam  Vitals reviewed.   Constitutional:       General: She is awake. She is not in acute distress.     Appearance: Normal appearance. She is well-developed. She is not ill-appearing, toxic-appearing or diaphoretic.   HENT:      Head: Normocephalic and atraumatic.      Nose: Nose normal.      Mouth/Throat:      Mouth: Mucous membranes are moist.      Pharynx: Oropharynx is clear. No oropharyngeal exudate or posterior oropharyngeal erythema.   Eyes:      General: Lids are normal. Gaze aligned appropriately. No scleral icterus.        Right eye: No discharge.         Left eye: No discharge.      Conjunctiva/sclera: Conjunctivae normal.   Neck:      Trachea: Trachea normal.   Cardiovascular:      Rate and Rhythm: Normal rate and regular rhythm.      Pulses:           Radial pulses are 2+ on the right side and 2+ on the left side.   Pulmonary:      Effort: Pulmonary effort is normal. No respiratory distress.      Breath sounds: No stridor. No wheezing.   Chest:      Chest wall: No tenderness.   Abdominal:      General: Bowel sounds are normal. There is no distension.      Palpations: Abdomen is soft. There is no fluid wave, hepatomegaly or mass.      Tenderness: There is no abdominal tenderness. There is no guarding or rebound.   Musculoskeletal:         General: No tenderness or deformity.      Cervical back: No tenderness.      Right lower leg: No edema.      Left lower leg: No edema.   Lymphadenopathy:      Cervical: No cervical adenopathy.   Skin:     General: Skin is warm and dry.      Capillary Refill: Capillary refill takes less than 2 seconds.      Coloration: Skin is not cyanotic, jaundiced or pale.   Neurological:      General: No focal deficit present.      Mental Status: She is alert and oriented to person, place, and time.      Motor: No tremor. "   Psychiatric:         Attention and Perception: Attention normal.         Mood and Affect: Mood and affect normal.         Speech: Speech normal.         Behavior: Behavior normal. Behavior is cooperative.            All of the data above and below has been reviewed by myself and any further interpretations will be reflected in the assessment and plan.   The data includes review of external notes, and independent interpretation of lab results, procedures, x-rays, and imaging reports.      Assessment:  Irritable bowel syndrome with constipation    Gastroesophageal reflux disease, unspecified whether esophagitis present  -     RABEprazole (ACIPHEX) 20 mg tablet; Take 1 tablet (20 mg total) by mouth once daily.  Dispense: 90 tablet; Refill: 4    History of colon polyps    Gastroesophageal reflux disease, unspecified whether esophagitis present  Comments:  Aciphex 20 daily, may try to wean once good BM regimen  Orders:  -     RABEprazole (ACIPHEX) 20 mg tablet; Take 1 tablet (20 mg total) by mouth once daily.  Dispense: 90 tablet; Refill: 4    Chronic idiopathic constipation    Colon due 2029.  GERD controlled with Aciphex 20 mg . Attempt to decrease to QOD dosing.  IBS-C, Linzess 145 mcg worked but cost prohibitive. Now with Bm daily with fiber.     Recommendations:    Attempt to decrease to Aciphex to every other day. If doing well, may take as needed.  May try taking GasX with each meal.    Risks, benefits, and alternatives of medical management, any associated procedures, and/or treatment discussed with the patient. Patient given opportunity to ask questions and voices understanding. Patient has elected to proceed with the recommended care modalities as discussed.    Instructed patient to notify my office if they have not been contacted within two weeks after any procedures, submitting any samples (biopsies, blood, stool, urine, etc.) or after any imaging (X-ray, CT, MRI, etc.).     Follow up in about 1 year  (around 1/14/2026).    Order summary:  No orders of the defined types were placed in this encounter.     This assessment, plan, and documentation was performed in collaboration with Glenys Russell NP.     This document may have been created using a voice recognition transcribing system. Incorrect words or phrases may have been missed during proofreading. Please interpret accordingly or contact me for clarification.     Mikayla Hicks MD

## 2025-01-14 NOTE — PATIENT INSTRUCTIONS
Attempt to decrease to Aciphex to every other day. If doing well, may take as needed.  May try taking GasX with each meal.    Please notify my office if you have not been contacted within two weeks after any procedures, submitting any samples (biopsies, blood, stool, urine, etc.) or after any imaging (X-ray, CT, MRI, etc.).

## 2025-01-14 NOTE — PROGRESS NOTES
Subjective:       Patient ID: Johanna Pichardo is a 64 y.o. female.    Chief Complaint: No chief complaint on file.      HPI: 64-year-old female, established patient, last seen 1 year ago.    Patient has history of frequency and urgency.  She is currently maintained on dietary and behavioral modifications.    She was on oxybutynin XL at 1 time.  Noticed no significant change.  She is a longer using oxybutynin, not for years.    Does admit to drinking Dr. Bills.  Does admit to some symptom flare-up with Dr. Pepper.      She has a history of microscopic hematuria.    She has had negative workup in the past.  Most recently she had a cysto in 2020 in ultrasound in 2023.      At this time she states she is doing well.  Denies any significant frequency, urgency, or nocturia.  Denies any odor to the urine.  Denies any fever or body aches.  Denies seeing any blood in urine.  Denies any unexpected weight loss.  Denies any new onset bone pain.    No other urinary complaints at this time.         Past Medical History:   Past Medical History:   Diagnosis Date    Allergy     Anxiety     BMI 23.0-23.9, adult     Carpal tunnel syndrome     left arm    GERD (gastroesophageal reflux disease)     High cholesterol     Hypertension     Irritable bowel syndrome        Past Surgical Historical:   Past Surgical History:   Procedure Laterality Date    APPENDECTOMY      CHOLECYSTECTOMY  11/2024    [facility]    COLONOSCOPY  10/03/2019    1 hyperp, repeat colon in 5y.    CYSTOSCOPY      ENDOSCOPY N/A     OOPHORECTOMY          Medications:   Medication List with Changes/Refills   Current Medications    ALREX 0.2 % DRPS        CLOBETASOL (TEMOVATE) 0.05 % CREAM        CLORAZEPATE (TRANXENE) 7.5 MG TAB        DICYCLOMINE (BENTYL) 10 MG/5 ML SYRUP    Take 5 mLs (10 mg total) by mouth 4 (four) times daily before meals and nightly.    FLUOCINONIDE 0.05% (LIDEX) 0.05 % CREAM    Apply 30 g topically as needed.    FLUTICASONE PROPIONATE (FLONASE) 50  MCG/ACTUATION NASAL SPRAY        HYDROXYZINE HCL (ATARAX) 25 MG TABLET    Take 25 mg by mouth as needed.    LINACLOTIDE (LINZESS) 145 MCG CAP CAPSULE    Take 1 capsule (145 mcg total) by mouth before breakfast.    LOSARTAN (COZAAR) 25 MG TABLET        LOSARTAN (COZAAR) 50 MG TABLET        NYSTATIN-TRIAMCINOLONE (MYCOLOG II) CREAM        RABEPRAZOLE (ACIPHEX) 20 MG TABLET    Take 1 tablet (20 mg total) by mouth once daily.    ROSUVASTATIN (CRESTOR) 10 MG TABLET        SOD SULF-POT CHLORIDE-MAG SULF (SUTAB) 1.479-0.188- 0.225 GRAM TABLET    Take according to package instructions with indicated amount of water. No breakfast day before test. May substitute with Suprep, Clenpiq, Plenvu, Moviprep or GoLytely based on Rx plan and patient preference.    TERCONAZOLE (TERAZOL 3) 0.8 % VAGINAL CREAM        TINIDAZOLE (TINDAMAX) 500 MG TABLET    Take 500 mg by mouth as needed.    VALACYCLOVIR (VALTREX) 1000 MG TABLET       Discontinued Medications    OXYBUTYNIN (DITROPAN-XL) 10 MG 24 HR TABLET    Take 1 tablet (10 mg total) by mouth once daily.        Past Social History:   Social History     Socioeconomic History    Marital status:    Tobacco Use    Smoking status: Every Day     Current packs/day: 0.25     Types: Cigarettes    Smokeless tobacco: Never   Substance and Sexual Activity    Alcohol use: Yes     Alcohol/week: 7.0 standard drinks of alcohol     Types: 4 Glasses of wine, 3 Shots of liquor per week    Drug use: Never    Sexual activity: Not Currently     Partners: Male       Allergies:   Review of patient's allergies indicates:   Allergen Reactions    Azithromycin Rash    Keflex [cephalexin] Rash        Family History:   Family History   Problem Relation Name Age of Onset    Colon cancer Neg Hx      Stomach cancer Neg Hx      Liver cancer Neg Hx      Liver disease Neg Hx      Pancreatic cancer Neg Hx      Esophageal cancer Neg Hx      Throat cancer Neg Hx      Ulcerative colitis Neg Hx      Crohn's disease Neg  Hx          Review of Systems:  Review of Systems   Constitutional:  Negative for activity change and appetite change.   HENT:  Negative for congestion and dental problem.    Respiratory:  Negative for chest tightness and shortness of breath.    Cardiovascular:  Negative for chest pain.   Gastrointestinal:  Negative for abdominal distention.   Genitourinary:  Negative for decreased urine volume, difficulty urinating, dyspareunia, dysuria, enuresis, flank pain, frequency, genital sores, hematuria, pelvic pain and urgency.   Musculoskeletal:  Negative for back pain and neck pain.   Allergic/Immunologic: Negative for immunocompromised state.   Neurological:  Negative for dizziness.   Hematological:  Negative for adenopathy.   Psychiatric/Behavioral:  Negative for agitation, behavioral problems and confusion.        Physical Exam:  Physical Exam  Vitals and nursing note reviewed.   Constitutional:       Appearance: She is well-developed.   HENT:      Head: Normocephalic.   Cardiovascular:      Rate and Rhythm: Normal rate and regular rhythm.      Heart sounds: Normal heart sounds.   Pulmonary:      Effort: Pulmonary effort is normal.      Breath sounds: Normal breath sounds.   Abdominal:      General: Bowel sounds are normal.      Palpations: Abdomen is soft.   Skin:     General: Skin is warm and dry.   Neurological:      Mental Status: She is alert and oriented to person, place, and time.       Urinalysis: Small blood, red blood cells 0-3.    Bladder scan: 134 cc    Assessment/Plan:   1. Microscopic hematuria: Small blood with red blood cells 0-3 noted today.    Patient has negative workups in the past.  Most recently cysto in 2020 in ultrasound 2023.      2. Frequency/urgency:  Patient continues to do well on dietary and behavioral modifications.      3. Incomplete bladder emptying: Patient's bladder scan is elevated at 134 cc.    Likely attributed to her Dr. Pepper use this morning.    Follow-up 1 year, sooner if  needed.  Problem List Items Addressed This Visit          Renal/    Hematuria, microscopic    Overview     Negative cysto 2020.    Negative ultrasound 2023          Other Visit Diagnoses       Urinary frequency    -  Primary    Relevant Orders    POCT Bladder Scan (Completed)    POCT Urinalysis(Instrument) (Completed)    Urinary urgency        Incomplete bladder emptying

## 2025-01-14 NOTE — LETTER
January 14, 2025        Matt Levy MD  [facility] [place] [address] [address]             [place] [facility]  [address] [address]  Phone: [phone]  Fax: [phone]   Patient: Johanna Pichardo   MR Number: [id number]   YOB: 1960   Date of Visit: 1/14/2025       Dear Dr. Levy:    Thank you for referring Johanna Pichardo to me for evaluation. Attached you will find relevant portions of my assessment and plan of care.    If you have questions, please do not hesitate to call me. I look forward to following Johanna Pichardo along with you.    Sincerely,      Mikayla Hicks MD            CC  No Recipients    Enclosure

## 2025-03-11 DIAGNOSIS — K21.9 GASTROESOPHAGEAL REFLUX DISEASE, UNSPECIFIED WHETHER ESOPHAGITIS PRESENT: ICD-10-CM

## 2025-03-14 RX ORDER — RABEPRAZOLE SODIUM 20 MG/1
20 TABLET, DELAYED RELEASE ORAL DAILY
Qty: 90 TABLET | Refills: 4 | Status: SHIPPED | OUTPATIENT
Start: 2025-03-14